# Patient Record
Sex: FEMALE
[De-identification: names, ages, dates, MRNs, and addresses within clinical notes are randomized per-mention and may not be internally consistent; named-entity substitution may affect disease eponyms.]

---

## 2018-04-27 PROBLEM — Z00.00 ENCOUNTER FOR PREVENTIVE HEALTH EXAMINATION: Status: ACTIVE | Noted: 2018-04-27

## 2018-05-09 ENCOUNTER — APPOINTMENT (OUTPATIENT)
Dept: CARDIOLOGY | Facility: CLINIC | Age: 83
End: 2018-05-09
Payer: MEDICARE

## 2018-05-09 ENCOUNTER — NON-APPOINTMENT (OUTPATIENT)
Age: 83
End: 2018-05-09

## 2018-05-09 VITALS
DIASTOLIC BLOOD PRESSURE: 86 MMHG | OXYGEN SATURATION: 98 % | HEIGHT: 60 IN | HEART RATE: 55 BPM | SYSTOLIC BLOOD PRESSURE: 139 MMHG | BODY MASS INDEX: 33.57 KG/M2 | WEIGHT: 171 LBS

## 2018-05-09 DIAGNOSIS — I21.9 ACUTE MYOCARDIAL INFARCTION, UNSPECIFIED: ICD-10-CM

## 2018-05-09 DIAGNOSIS — Z78.9 OTHER SPECIFIED HEALTH STATUS: ICD-10-CM

## 2018-05-09 PROCEDURE — 99215 OFFICE O/P EST HI 40 MIN: CPT | Mod: 25

## 2018-05-09 PROCEDURE — 93000 ELECTROCARDIOGRAM COMPLETE: CPT

## 2018-05-30 ENCOUNTER — OUTPATIENT (OUTPATIENT)
Dept: OUTPATIENT SERVICES | Facility: HOSPITAL | Age: 83
LOS: 1 days | End: 2018-05-30
Payer: COMMERCIAL

## 2018-05-30 DIAGNOSIS — I25.118 ATHEROSCLEROTIC HEART DISEASE OF NATIVE CORONARY ARTERY WITH OTHER FORMS OF ANGINA PECTORIS: ICD-10-CM

## 2018-05-30 PROCEDURE — 93017 CV STRESS TEST TRACING ONLY: CPT

## 2018-05-30 PROCEDURE — 93016 CV STRESS TEST SUPVJ ONLY: CPT

## 2018-05-30 PROCEDURE — A9500: CPT

## 2018-05-30 PROCEDURE — 78452 HT MUSCLE IMAGE SPECT MULT: CPT

## 2018-05-30 PROCEDURE — 93018 CV STRESS TEST I&R ONLY: CPT

## 2018-05-30 PROCEDURE — 78452 HT MUSCLE IMAGE SPECT MULT: CPT | Mod: 26

## 2018-06-07 ENCOUNTER — OTHER (OUTPATIENT)
Age: 83
End: 2018-06-07

## 2018-06-12 ENCOUNTER — FORM ENCOUNTER (OUTPATIENT)
Age: 83
End: 2018-06-12

## 2018-06-13 ENCOUNTER — OUTPATIENT (OUTPATIENT)
Dept: OUTPATIENT SERVICES | Facility: HOSPITAL | Age: 83
LOS: 1 days | End: 2018-06-13
Payer: COMMERCIAL

## 2018-06-13 DIAGNOSIS — I25.118 ATHEROSCLEROTIC HEART DISEASE OF NATIVE CORONARY ARTERY WITH OTHER FORMS OF ANGINA PECTORIS: ICD-10-CM

## 2018-06-13 PROCEDURE — 93306 TTE W/DOPPLER COMPLETE: CPT

## 2018-06-13 PROCEDURE — 93306 TTE W/DOPPLER COMPLETE: CPT | Mod: 26

## 2018-08-15 ENCOUNTER — APPOINTMENT (OUTPATIENT)
Dept: CARDIOLOGY | Facility: CLINIC | Age: 83
End: 2018-08-15

## 2018-12-19 ENCOUNTER — APPOINTMENT (OUTPATIENT)
Dept: CARDIOLOGY | Facility: CLINIC | Age: 83
End: 2018-12-19
Payer: MEDICARE

## 2018-12-19 ENCOUNTER — NON-APPOINTMENT (OUTPATIENT)
Age: 83
End: 2018-12-19

## 2018-12-19 VITALS
WEIGHT: 168 LBS | RESPIRATION RATE: 14 BRPM | OXYGEN SATURATION: 95 % | DIASTOLIC BLOOD PRESSURE: 60 MMHG | HEIGHT: 60 IN | SYSTOLIC BLOOD PRESSURE: 110 MMHG | BODY MASS INDEX: 32.98 KG/M2 | HEART RATE: 54 BPM

## 2018-12-19 PROCEDURE — 99215 OFFICE O/P EST HI 40 MIN: CPT | Mod: 25

## 2018-12-19 PROCEDURE — 93000 ELECTROCARDIOGRAM COMPLETE: CPT

## 2018-12-19 RX ORDER — GLIPIZIDE 2.5 MG/1
2.5 TABLET, EXTENDED RELEASE ORAL DAILY
Refills: 0 | Status: ACTIVE | COMMUNITY

## 2018-12-19 NOTE — DISCUSSION/SUMMARY
[FreeTextEntry1] : 86 yo woman with known CAD and moderate AS\par C/O severe heartburn that resolves with omeprazole. Refuses to go for GED\par Nuclear stress revealed non reversible anterior ischemia and EF 36%.\par Limited ambulation because of the knee pain\par Will continue all medications\par Follow up in 6 months

## 2018-12-19 NOTE — HISTORY OF PRESENT ILLNESS
[FreeTextEntry1] : 85 y woman with known HTN, DM2 and hyperlipidemia all under medical management. Has known CAD since 12/2015 when she was admitted at [facility] with a myocardial infarction and underwent urgent coronary angiography and PCI to the LAD. Preserved LV function. No records available at the present time. Since then under medical follow up.\par C/O effort related SOB, but seems to be limited in her ambulation because of left knee pain (arthritis). Also c/o severe heartburn that resolves immediately after taking omeprazole. She adamantly refuses to have an endoscopy. \par Had LE cataract surgery with no complications. \par Denies chest pain, orthopnea or PND. Occasional dizziness. No palpitations or ankle swelling.\par Denies smoking, has social drinking. Denies the use of illicit drugs.\par \par

## 2018-12-19 NOTE — REASON FOR VISIT
[Follow-Up - Clinic] : a clinic follow-up of [Spouse] : spouse [FreeTextEntry1] : Follow up for HTN and reflux

## 2018-12-19 NOTE — PHYSICAL EXAM
[General Appearance - Well Developed] : well developed [Normal Appearance] : normal appearance [General Appearance - Well Nourished] : well nourished [No Deformities] : no deformities [Normal Conjunctiva] : the conjunctiva exhibited no abnormalities [Normal Oral Mucosa] : normal oral mucosa [No Oral Pallor] : no oral pallor [Normal Oropharynx] : normal oropharynx [Normal Jugular Venous V Waves Present] : normal jugular venous V waves present [Respiration, Rhythm And Depth] : normal respiratory rhythm and effort [Exaggerated Use Of Accessory Muscles For Inspiration] : no accessory muscle use [Auscultation Breath Sounds / Voice Sounds] : lungs were clear to auscultation bilaterally [Chest Palpation] : palpation of the chest revealed no abnormalities [Lungs Percussion] : the lungs were normal to percussion [Heart Rate And Rhythm] : heart rate and rhythm were normal [Heart Sounds] : normal S1 and S2 [Arterial Pulses Normal] : the arterial pulses were normal [Edema] : no peripheral edema present [Veins - Varicosity Changes] : no varicosital changes were noted in the lower extremities [Systolic grade ___/6] : A grade [unfilled]/6 systolic murmur was heard. [Bowel Sounds] : normal bowel sounds [Abdomen Soft] : soft [Abdomen Tenderness] : non-tender [Abdomen Mass (___ Cm)] : no abdominal mass palpated [Abdomen Hernia] : no hernia was discovered [Abnormal Walk] : normal gait [Nail Clubbing] : no clubbing of the fingernails [Cyanosis, Localized] : no localized cyanosis [Petechial Hemorrhages (___cm)] : no petechial hemorrhages [Nail Splinter Hemorrhages] : no splinter hemorrhages of the nails [Skin Color & Pigmentation] : normal skin color and pigmentation [Skin Turgor] : normal skin turgor [] : no rash [Oriented To Time, Place, And Person] : oriented to person, place, and time [Impaired Insight] : insight and judgment were intact [No Anxiety] : not feeling anxious [FreeTextEntry1] : OPAL 3/6 at the LSB with preserved S2

## 2018-12-19 NOTE — REVIEW OF SYSTEMS
[Shortness Of Breath] : shortness of breath [Dyspnea on exertion] : dyspnea during exertion [Negative] : Heme/Lymph [Heartburn] : heartburn

## 2018-12-19 NOTE — ASSESSMENT
[FreeTextEntry1] : ECG performed today at the clinic revealed sinus rhythm 50 BPM, LPHB and RBBB with Q in V1-3

## 2019-06-05 ENCOUNTER — NON-APPOINTMENT (OUTPATIENT)
Age: 84
End: 2019-06-05

## 2019-06-05 ENCOUNTER — APPOINTMENT (OUTPATIENT)
Dept: CARDIOLOGY | Facility: CLINIC | Age: 84
End: 2019-06-05
Payer: MEDICARE

## 2019-06-05 VITALS
DIASTOLIC BLOOD PRESSURE: 74 MMHG | WEIGHT: 168 LBS | BODY MASS INDEX: 32.81 KG/M2 | SYSTOLIC BLOOD PRESSURE: 118 MMHG | OXYGEN SATURATION: 96 % | HEART RATE: 61 BPM

## 2019-06-05 DIAGNOSIS — Z87.448 PERSONAL HISTORY OF OTHER DISEASES OF URINARY SYSTEM: ICD-10-CM

## 2019-06-05 DIAGNOSIS — K21.9 GASTRO-ESOPHAGEAL REFLUX DISEASE W/OUT ESOPHAGITIS: ICD-10-CM

## 2019-06-05 PROCEDURE — 93000 ELECTROCARDIOGRAM COMPLETE: CPT

## 2019-06-05 PROCEDURE — 93306 TTE W/DOPPLER COMPLETE: CPT

## 2019-06-05 PROCEDURE — 99214 OFFICE O/P EST MOD 30 MIN: CPT | Mod: 25

## 2019-06-05 NOTE — ASSESSMENT
[FreeTextEntry1] : ECG performed today at the clinic revealed sinus rhythm 51 BPM, LPHB and RBBB with Q in V1-3

## 2019-06-05 NOTE — REVIEW OF SYSTEMS
[Shortness Of Breath] : shortness of breath [Dyspnea on exertion] : dyspnea during exertion [Heartburn] : heartburn [Negative] : Psychiatric

## 2019-06-05 NOTE — PHYSICAL EXAM
[Normal Appearance] : normal appearance [General Appearance - Well Developed] : well developed [General Appearance - Well Nourished] : well nourished [Normal Conjunctiva] : the conjunctiva exhibited no abnormalities [No Deformities] : no deformities [Normal Oral Mucosa] : normal oral mucosa [No Oral Pallor] : no oral pallor [Normal Oropharynx] : normal oropharynx [Normal Jugular Venous V Waves Present] : normal jugular venous V waves present [Exaggerated Use Of Accessory Muscles For Inspiration] : no accessory muscle use [Respiration, Rhythm And Depth] : normal respiratory rhythm and effort [Auscultation Breath Sounds / Voice Sounds] : lungs were clear to auscultation bilaterally [Lungs Percussion] : the lungs were normal to percussion [Chest Palpation] : palpation of the chest revealed no abnormalities [Heart Rate And Rhythm] : heart rate and rhythm were normal [Heart Sounds] : normal S1 and S2 [Veins - Varicosity Changes] : no varicosital changes were noted in the lower extremities [Arterial Pulses Normal] : the arterial pulses were normal [Edema] : no peripheral edema present [Systolic grade ___/6] : A grade [unfilled]/6 systolic murmur was heard. [Abdomen Soft] : soft [Bowel Sounds] : normal bowel sounds [Abdomen Tenderness] : non-tender [Abdomen Hernia] : no hernia was discovered [Abdomen Mass (___ Cm)] : no abdominal mass palpated [Abnormal Walk] : normal gait [Nail Clubbing] : no clubbing of the fingernails [Cyanosis, Localized] : no localized cyanosis [Petechial Hemorrhages (___cm)] : no petechial hemorrhages [Nail Splinter Hemorrhages] : no splinter hemorrhages of the nails [Skin Turgor] : normal skin turgor [] : no rash [Skin Color & Pigmentation] : normal skin color and pigmentation [Oriented To Time, Place, And Person] : oriented to person, place, and time [Impaired Insight] : insight and judgment were intact [No Anxiety] : not feeling anxious [FreeTextEntry1] : OPAL 4/6 at the LSB with diminished S2

## 2019-06-05 NOTE — DISCUSSION/SUMMARY
[FreeTextEntry1] : 86 yo woman with known CAD and moderate AS, might be getting to severe according to auscultation and has effort related SOB (NYHA 3/4)\par C/O severe heartburn that resolves with omeprazole. Refuses to go for GED\par Limited ambulation because of the knee pain and SOB\par Will continue all medications\par Will perform echocardiogram to assess AS and LVEF\par Follow up in 6 months

## 2019-06-05 NOTE — HISTORY OF PRESENT ILLNESS
[FreeTextEntry1] : 85 y woman with known HTN, DM2 and hyperlipidemia all under medical management. Has known CAD since 12/2015 when she was admitted at [facility] with a myocardial infarction and underwent urgent coronary angiography and PCI to the LAD. Preserved LV function. No records available at the present time. Since then under medical follow up.\par C/O effort related SOB (NYHA=3/4) but seems to be limited in her ambulation because of left knee pain (arthritis). Had  arthrocentesis but still has severe pain while walking. Also c/o severe heartburn that resolves immediately after taking omeprazole. She adamantly refuses to have an endoscopy. She takes NSAIDs for the knee pain and she understands that it will aggravate her GERD and is not recommended in pts with CAD. \par Last echocardiogram was done in 6/2018 revealed moderate LV dysfunction with EF= 40-45% and moderate AS 1.16 cm2. \par Had BE cataract surgery with no complications. \par Denies chest pain, orthopnea or PND. Occasional dizziness. No palpitations or ankle swelling.\par Denies smoking, has social drinking. Denies the use of illicit drugs.\par \par

## 2019-07-10 ENCOUNTER — OUTPATIENT (OUTPATIENT)
Dept: OUTPATIENT SERVICES | Facility: HOSPITAL | Age: 84
LOS: 1 days | End: 2019-07-10
Payer: COMMERCIAL

## 2019-07-10 VITALS — WEIGHT: 160.06 LBS | HEIGHT: 60 IN

## 2019-07-10 VITALS
HEIGHT: 60 IN | OXYGEN SATURATION: 100 % | RESPIRATION RATE: 20 BRPM | SYSTOLIC BLOOD PRESSURE: 137 MMHG | HEART RATE: 58 BPM | WEIGHT: 167.55 LBS | TEMPERATURE: 98 F | DIASTOLIC BLOOD PRESSURE: 65 MMHG

## 2019-07-10 DIAGNOSIS — Z01.810 ENCOUNTER FOR PREPROCEDURAL CARDIOVASCULAR EXAMINATION: ICD-10-CM

## 2019-07-10 DIAGNOSIS — Z98.61 CORONARY ANGIOPLASTY STATUS: Chronic | ICD-10-CM

## 2019-07-10 DIAGNOSIS — R06.02 SHORTNESS OF BREATH: ICD-10-CM

## 2019-07-10 LAB
ANION GAP SERPL CALC-SCNC: 11 MMOL/L — SIGNIFICANT CHANGE UP (ref 5–17)
APTT BLD: 30.3 SEC — SIGNIFICANT CHANGE UP (ref 27.5–36.3)
BUN SERPL-MCNC: 19 MG/DL — SIGNIFICANT CHANGE UP (ref 8–20)
CALCIUM SERPL-MCNC: 10.1 MG/DL — SIGNIFICANT CHANGE UP (ref 8.6–10.2)
CHLORIDE SERPL-SCNC: 107 MMOL/L — SIGNIFICANT CHANGE UP (ref 98–107)
CO2 SERPL-SCNC: 23 MMOL/L — SIGNIFICANT CHANGE UP (ref 22–29)
CREAT SERPL-MCNC: 0.95 MG/DL — SIGNIFICANT CHANGE UP (ref 0.5–1.3)
GLUCOSE SERPL-MCNC: 108 MG/DL — SIGNIFICANT CHANGE UP (ref 70–115)
HCT VFR BLD CALC: 36.9 % — SIGNIFICANT CHANGE UP (ref 34.5–45)
HGB BLD-MCNC: 12.2 G/DL — SIGNIFICANT CHANGE UP (ref 11.5–15.5)
INR BLD: 1.04 RATIO — SIGNIFICANT CHANGE UP (ref 0.88–1.16)
MAGNESIUM SERPL-MCNC: 1.8 MG/DL — SIGNIFICANT CHANGE UP (ref 1.6–2.6)
MCHC RBC-ENTMCNC: 31.7 PG — SIGNIFICANT CHANGE UP (ref 27–34)
MCHC RBC-ENTMCNC: 33.1 GM/DL — SIGNIFICANT CHANGE UP (ref 32–36)
MCV RBC AUTO: 95.8 FL — SIGNIFICANT CHANGE UP (ref 80–100)
PLATELET # BLD AUTO: 201 K/UL — SIGNIFICANT CHANGE UP (ref 150–400)
POTASSIUM SERPL-MCNC: 4.8 MMOL/L — SIGNIFICANT CHANGE UP (ref 3.5–5.3)
POTASSIUM SERPL-SCNC: 4.8 MMOL/L — SIGNIFICANT CHANGE UP (ref 3.5–5.3)
PROTHROM AB SERPL-ACNC: 12 SEC — SIGNIFICANT CHANGE UP (ref 10–12.9)
RBC # BLD: 3.85 M/UL — SIGNIFICANT CHANGE UP (ref 3.8–5.2)
RBC # FLD: 12.7 % — SIGNIFICANT CHANGE UP (ref 10.3–14.5)
SODIUM SERPL-SCNC: 141 MMOL/L — SIGNIFICANT CHANGE UP (ref 135–145)
WBC # BLD: 8.47 K/UL — SIGNIFICANT CHANGE UP (ref 3.8–10.5)
WBC # FLD AUTO: 8.47 K/UL — SIGNIFICANT CHANGE UP (ref 3.8–10.5)

## 2019-07-10 PROCEDURE — 36415 COLL VENOUS BLD VENIPUNCTURE: CPT

## 2019-07-10 PROCEDURE — 93010 ELECTROCARDIOGRAM REPORT: CPT

## 2019-07-10 PROCEDURE — 85730 THROMBOPLASTIN TIME PARTIAL: CPT

## 2019-07-10 PROCEDURE — 93005 ELECTROCARDIOGRAM TRACING: CPT

## 2019-07-10 PROCEDURE — 83735 ASSAY OF MAGNESIUM: CPT

## 2019-07-10 PROCEDURE — G0463: CPT

## 2019-07-10 PROCEDURE — 85610 PROTHROMBIN TIME: CPT

## 2019-07-10 PROCEDURE — 80048 BASIC METABOLIC PNL TOTAL CA: CPT

## 2019-07-10 PROCEDURE — 85027 COMPLETE CBC AUTOMATED: CPT

## 2019-07-10 RX ORDER — SODIUM CHLORIDE 9 MG/ML
1000 INJECTION INTRAMUSCULAR; INTRAVENOUS; SUBCUTANEOUS
Refills: 0 | Status: DISCONTINUED | OUTPATIENT
Start: 2019-07-12 | End: 2019-07-27

## 2019-07-10 NOTE — H&P PST ADULT - NSICDXPROBLEM_GEN_ALL_CORE_FT
PROBLEM DIAGNOSES  Problem: Shortness of breath  Assessment and Plan: -R&LHC to assess CAD, pressures, aortic valve  area  -procedure d/w patient; risks and benefits discussed, questions answered  -IVF pre procedure as ordered  -labs, ECG, diagnostic reports, cards outpt notes all reviewed  -npo after midnight

## 2019-07-10 NOTE — H&P PST ADULT - NSICDXPASTMEDICALHX_GEN_ALL_CORE_FT
PAST MEDICAL HISTORY:  Bilateral knee pain     CAD (coronary artery disease)     Diabetes mellitus     Excessive use of nonsteroidal anti-inflammatory drug (NSAID)     GERD (gastroesophageal reflux disease)     Grade II diastolic dysfunction     HLD (hyperlipidemia)     HTN (hypertension)     Moderate aortic stenosis     STEMI involving left anterior descending coronary artery PCI 2015

## 2019-07-10 NOTE — H&P PST ADULT - NEUROLOGICAL DETAILS
responds to pain/alert and oriented x 3/no spontaneous movement/responds to verbal commands/normal strength/sensation intact/deep reflexes intact

## 2019-07-10 NOTE — H&P PST ADULT - RS GEN PE MLT RESP DETAILS PC
good air movement/clear to auscultation bilaterally/respirations non-labored/breath sounds equal/normal/airway patent

## 2019-07-10 NOTE — H&P PST ADULT - HISTORY OF PRESENT ILLNESS
Narrative: This is an 84 y/o white F with a PMHx of HTN, DM II, HLD, moderate aortic stenosis, grade II diastolic dysfunction, STEMI with PCI to LAD (2015; at [facility] per pt; report requested and pending; no available stent card), NYHA III (EF 40-45%) presents today for R&LHC with Dr Humphreys to assess CAD, right heart pressures and aortic valve area. Known risk factors include previous MI, known CAD, NYHA III; pt reports increasing symptoms of shortness of breath, HAQUE. She reports she has bilateral knee pain daily and frequently uses NSAIDs which is not recommended in patients with CAD. Pt currently denies chest pain, SOB, dyspnea, palps, lower extremity edema.    Assessment of LVEF:       EF: 35-40%       Assessed by: TTE       Date: 6/5/2019; report revealed apical septal segment, apical anterior segment, apical inferior segment and apex abnormal, moderate decreased global LV systolic function, EF 35-40%, mildly increased LV internal cavity size, mitral valve leaflets are tethered which is due to reduced systolic function and elevated LVEDP, LAP 36mmHg, normal RV size and function, severely enlarged LA, mild MR, mild AR, no effusion.   Aortic valve: LVOT Vmax 0.75m/s, Aov Area, Vmax 1.04cm, LVOT VTI 0.179m, AoV area 1.10cm, AoV cusp separation 1.60cm    Prior Cardiac Interventions:       PCI's: STEMI with PCI to LAD (2015; at [facility] per pt; report requested and pending; no available stent card)    Pharmacologic Stress Test from 5/30/2018 revealed medium sized, severe defects in mid to distal anterior, inferior and anteroseptal as well as apical cap that are fixed, suggestive of infarct, akinetic mid to distal anterior, anteroseptal, inferior and apical cap with overall LVEF 36%, LVEDV 135ml      Antianginal Therapies:        Beta Blockers:  metoprolol 50mg daily      ASA: 3  Mallampati: 2  Bleeding Risk:  Creatinine:0.95  GFR:55 Narrative: This is an 86 y/o white F with a PMHx of HTN, DM II, HLD, moderate aortic stenosis, grade II diastolic dysfunction, STEMI with PCI to LAD (2015; at [facility] per pt; report requested and pending; no available stent card), NYHA III (EF 40-45%) presents today for R&LHC with Dr Humphreys to assess CAD, right heart pressures and aortic valve area. Known risk factors include previous MI, known CAD, NYHA III; pt reports increasing symptoms of shortness of breath, HAQUE. She reports she has bilateral knee pain daily and frequently uses NSAIDs which is not recommended in patients with CAD. Pt currently denies chest pain, SOB, dyspnea, palps, lower extremity edema.    Assessment of LVEF:       EF: 35-40%       Assessed by: TTE       Date: 6/5/2019; report revealed apical septal segment, apical anterior segment, apical inferior segment and apex abnormal, moderate decreased global LV systolic function, EF 35-40%, mildly increased LV internal cavity size, mitral valve leaflets are tethered which is due to reduced systolic function and elevated LVEDP, LAP 36mmHg, normal RV size and function, severely enlarged LA, mild MR, mild AR, no effusion.   Aortic valve: LVOT Vmax 0.75m/s, Aov Area, Vmax 1.04cm, LVOT VTI 0.179m, AoV area 1.10cm, AoV cusp separation 1.60cm    Prior Cardiac Interventions:       PCI's: STEMI with PCI to LAD (2015; at [facility] per pt; report requested and pending; no available stent card)    Pharmacologic Stress Test from 5/30/2018 revealed medium sized, severe defects in mid to distal anterior, inferior and anteroseptal as well as apical cap that are fixed, suggestive of infarct, akinetic mid to distal anterior, anteroseptal, inferior and apical cap with overall LVEF 36%, LVEDV 135ml      Antianginal Therapies:        Beta Blockers:  metoprolol 50mg daily      ASA: 3  Mallampati: 2  Bleeding Risk: 7.6%  Creatinine: 0.95  GFR: 55

## 2019-07-10 NOTE — H&P PST ADULT - ASSESSMENT
84 y/o white F with a PMHx of HTN, DM II, HLD, moderate aortic stenosis, grade II diastolic dysfunction, STEMI with PCI to LAD (2015; at [facility] per pt; report requested and pending; no available stent card), NYHA III (EF 40-45%) presents today for R&LHC with Dr Humphreys to assess CAD, right heart pressures and aortic valve area.

## 2019-07-10 NOTE — H&P PST ADULT - NEGATIVE NEUROLOGICAL SYMPTOMS
no generalized seizures/no loss of sensation/no transient paralysis/no weakness/no syncope/no headache/no paresthesias/no focal seizures/no tremors/no vertigo

## 2019-07-12 ENCOUNTER — TRANSCRIPTION ENCOUNTER (OUTPATIENT)
Age: 84
End: 2019-07-12

## 2019-07-12 ENCOUNTER — OUTPATIENT (OUTPATIENT)
Dept: OUTPATIENT SERVICES | Facility: HOSPITAL | Age: 84
LOS: 1 days | End: 2019-07-12
Payer: COMMERCIAL

## 2019-07-12 VITALS
HEART RATE: 56 BPM | RESPIRATION RATE: 18 BRPM | DIASTOLIC BLOOD PRESSURE: 69 MMHG | OXYGEN SATURATION: 96 % | SYSTOLIC BLOOD PRESSURE: 133 MMHG | TEMPERATURE: 98 F

## 2019-07-12 VITALS
OXYGEN SATURATION: 97 % | SYSTOLIC BLOOD PRESSURE: 119 MMHG | DIASTOLIC BLOOD PRESSURE: 62 MMHG | HEART RATE: 63 BPM | RESPIRATION RATE: 18 BRPM

## 2019-07-12 DIAGNOSIS — Z98.61 CORONARY ANGIOPLASTY STATUS: Chronic | ICD-10-CM

## 2019-07-12 DIAGNOSIS — I35.0 NONRHEUMATIC AORTIC (VALVE) STENOSIS: ICD-10-CM

## 2019-07-12 PROCEDURE — 99152 MOD SED SAME PHYS/QHP 5/>YRS: CPT

## 2019-07-12 PROCEDURE — C1894: CPT

## 2019-07-12 PROCEDURE — C1887: CPT

## 2019-07-12 PROCEDURE — C1769: CPT

## 2019-07-12 PROCEDURE — C1889: CPT

## 2019-07-12 PROCEDURE — 93460 R&L HRT ART/VENTRICLE ANGIO: CPT | Mod: 26

## 2019-07-12 PROCEDURE — 99153 MOD SED SAME PHYS/QHP EA: CPT

## 2019-07-12 PROCEDURE — 93460 R&L HRT ART/VENTRICLE ANGIO: CPT

## 2019-07-12 RX ORDER — LOSARTAN POTASSIUM 100 MG/1
1 TABLET, FILM COATED ORAL
Qty: 0 | Refills: 0 | DISCHARGE

## 2019-07-12 NOTE — PROGRESS NOTE ADULT - ASSESSMENT
Narrative: This is an 86 y/o white F with a PMHx of HTN, DM II, HLD, moderate aortic stenosis, grade II diastolic dysfunction, STEMI with PCI to LAD (2015; at [facility] per pt; report requested and pending; no available stent card), NYHA III (EF 40-45%) presents today for R&LHC with Dr Humphreys to assess CAD, right heart pressures and aortic valve area. Known risk factors include previous MI, known CAD, NYHA III; pt reports increasing symptoms of shortness of breath, HAQUE. She reports she has bilateral knee pain daily and frequently uses NSAIDs which is not recommended in patients with CAD. Pt currently denies chest pain, SOB, dyspnea, palps, lower extremity edema.    Assessment of LVEF:       EF: 35-40%       Assessed by: TTE       Date: 6/5/2019; report revealed apical septal segment, apical anterior segment, apical inferior segment and apex abnormal, moderate decreased global LV systolic function, EF 35-40%, mildly increased LV internal cavity size, mitral valve leaflets are tethered which is due to reduced systolic function and elevated LVEDP, LAP 36mmHg, normal RV size and function, severely enlarged LA, mild MR, mild AR, no effusion.   Aortic valve: LVOT Vmax 0.75m/s, Aov Area, Vmax 1.04cm, LVOT VTI 0.179m, AoV area 1.10cm, AoV cusp separation 1.60cm    Prior Cardiac Interventions:       PCI's: STEMI with PCI to LAD (2015; at [facility] per pt; report requested and pending; no available stent card)    Pharmacologic Stress Test from 5/30/2018 revealed medium sized, severe defects in mid to distal anterior, inferior and anteroseptal as well as apical cap that are fixed, suggestive of infarct, akinetic mid to distal anterior, anteroseptal, inferior and apical cap with overall LVEF 36%, LVEDV 135ml      Antianginal Therapies:        Beta Blockers:  metoprolol 50mg daily      ASA: 3  Mallampati: 2  Bleeding Risk: 7.6%  Creatinine: 0.95  GFR: 55

## 2019-07-12 NOTE — DISCHARGE NOTE PROVIDER - NSDCCPTREATMENT_GEN_ALL_CORE_FT
PRINCIPAL PROCEDURE  Procedure: Left heart cardiac cath  Findings and Treatment: s/p no intervention see full report

## 2019-07-12 NOTE — DISCHARGE NOTE PROVIDER - NSDCFUADDINST_GEN_ALL_CORE_FT
No heavy lifting, driving, sex, tub baths, swimming, or any activity that submerges the lower half of the body in water for 48 hours.  Limited walking and stairs for 48 hours.      Observe the site frequently.  If bleeding or a large lump (the size of a golf ball or bigger) occurs lie flat, apply continuous direct pressure just above the puncture site for at least 10 minutes, and notify your physician immediately.  If the bleeding cannot be controlled, call 911 immediately for assistance.  Notify your physician of pain, swelling or any drainage.    Notify your physician immediately if coldness, numbness, discoloration or pain in your foot occurs.

## 2019-07-12 NOTE — DISCHARGE NOTE PROVIDER - HOSPITAL COURSE
84 y/o white F with a PMHx of HTN, DM II, HLD, moderate aortic stenosis, grade II diastolic dysfunction, STEMI with PCI to LAD (2015; at [facility] per pt; report requested and pending; no available stent card), NYHA III (EF 40-45%) presents today for R&LHC with Dr Humphreys to assess CAD, right heart pressures and aortic valve area. Known risk factors include previous MI, known CAD, NYHA III; pt reports increasing symptoms of shortness of breath, HAQUE. She reports she has bilateral knee pain daily and frequently uses NSAIDs which is not recommended in patients with CAD. Now S/P C no intervention see full report, RFA with sheath removed by RN without incident. Pt currently denies chest pain, SOB, dyspnea, palps, lower extremity edema.         Neuro: A&OX3    Lungs: CTA B/L    CV: S1, S2, no murmur, RRR    Abd: Soft    Right Groin: Soft, no bleeding, no hematoma    Extremity: + distal pulses            A/P: 85y Female s/p C no intervention    1. Groin management discussed with patient    2. Continue current meds    3. Follow up as an outpatient with cardiologist    4. Bedrest x 3 hours    5. Discharge at 1400 if groin stable

## 2019-07-12 NOTE — DISCHARGE NOTE PROVIDER - CARE PROVIDER_API CALL
Maged Humphreys)  Cardiovascular Disease; Internal Medicine; Interventional Cardiology  Phone: [phone]  Fax: [phone]  Follow Up Time: 2 weeks

## 2019-07-12 NOTE — PROGRESS NOTE ADULT - SUBJECTIVE AND OBJECTIVE BOX
CC:  Patient is a 85y old  Female who presents with a chief complaint of     HPI:  Narrative: This is an 84 y/o white F with a PMHx of HTN, DM II, HLD, moderate aortic stenosis, grade II diastolic dysfunction, STEMI with PCI to LAD (2015; at [facility] per pt; report requested and pending; no available stent card), NYHA III (EF 40-45%) presents today for R&LHC with Dr Humphreys to assess CAD, right heart pressures and aortic valve area. Known risk factors include previous MI, known CAD, NYHA III; pt reports increasing symptoms of shortness of breath, HAQUE. She reports she has bilateral knee pain daily and frequently uses NSAIDs which is not recommended in patients with CAD. Pt currently denies chest pain, SOB, dyspnea, palps, lower extremity edema.    Assessment of LVEF:       EF: 35-40%       Assessed by: TTE       Date: 6/5/2019; report revealed apical septal segment, apical anterior segment, apical inferior segment and apex abnormal, moderate decreased global LV systolic function, EF 35-40%, mildly increased LV internal cavity size, mitral valve leaflets are tethered which is due to reduced systolic function and elevated LVEDP, LAP 36mmHg, normal RV size and function, severely enlarged LA, mild MR, mild AR, no effusion.   Aortic valve: LVOT Vmax 0.75m/s, Aov Area, Vmax 1.04cm, LVOT VTI 0.179m, AoV area 1.10cm, AoV cusp separation 1.60cm    Prior Cardiac Interventions:       PCI's: STEMI with PCI to LAD (2015; at [facility] per pt; report requested and pending; no available stent card)    Pharmacologic Stress Test from 5/30/2018 revealed medium sized, severe defects in mid to distal anterior, inferior and anteroseptal as well as apical cap that are fixed, suggestive of infarct, akinetic mid to distal anterior, anteroseptal, inferior and apical cap with overall LVEF 36%, LVEDV 135ml      Antianginal Therapies:        Beta Blockers:  metoprolol 50mg daily      ASA: 3  Mallampati: 2  Bleeding Risk: 7.6%  Creatinine: 0.95  GFR: 55      ROS: All review of systems negative unless indicated otherwise below.     Lab Results:  CBC  CBC Full  -  ( 10 Jul 2019 14:40 )  WBC Count : 8.47 K/uL  RBC Count : 3.85 M/uL  Hemoglobin : 12.2 g/dL  Hematocrit : 36.9 %  Platelet Count - Automated : 201 K/uL  Mean Cell Volume : 95.8 fl  Mean Cell Hemoglobin : 31.7 pg  Mean Cell Hemoglobin Concentration : 33.1 gm/dL  Auto Neutrophil # : x  Auto Lymphocyte # : x  Auto Monocyte # : x  Auto Eosinophil # : x  Auto Basophil # : x  Auto Neutrophil % : x  Auto Lymphocyte % : x  Auto Monocyte % : x  Auto Eosinophil % : x  Auto Basophil % : x    .		Differential:	[] Automated		[] Manual  Chemistry                        12.2   8.47  )-----------( 201      ( 10 Jul 2019 14:40 )             36.9     07-10    141  |  107  |  19.0  ----------------------------<  108  4.8   |  23.0  |  0.95    Ca    10.1      10 Jul 2019 14:40  Mg     1.8     07-10        PT/INR - ( 10 Jul 2019 14:40 )   PT: 12.0 sec;   INR: 1.04 ratio         PTT - ( 10 Jul 2019 14:40 )  PTT:30.3 sec      PHYSICAL EXAM:  Vital Signs Last 24 Hrs  T(C): 36.8 (12 Jul 2019 07:24), Max: 36.8 (12 Jul 2019 07:24)  T(F): 98.2 (12 Jul 2019 07:24), Max: 98.2 (12 Jul 2019 07:24)  HR: 56 (12 Jul 2019 07:24) (56 - 56)  BP: 133/69 (12 Jul 2019 07:24) (133/69 - 133/69)  BP(mean): --  RR: 18 (12 Jul 2019 07:24) (18 - 18)  SpO2: 96% (12 Jul 2019 07:24) (96% - 96%)  GENERAL: NAD, well-groomed, well-developed  HEAD:  Atraumatic, Normocephalic  NECK: Supple, No JVD  NERVOUS SYSTEM:  Alert & Oriented X3, Good concentration; Motor Strength 5/5 B/L upper and lower extremities, sensation intact  CHEST/LUNG: Clear to auscultation bilaterally, No rales, rhonchi, wheezing, or rubs  HEART: Regular rate and rhythm; s1 and s2 auscultated, No murmurs, rubs, or gallops  ABDOMEN: Soft, Nontender, Nondistended; Bowel sounds present and normoactive.   EXTREMITIES:  2+ Peripheral Pulses, No clubbing, cyanosis, or edema  SKIN: No rashes or lesions

## 2019-07-12 NOTE — DISCHARGE NOTE PROVIDER - NSDCCPCAREPLAN_GEN_ALL_CORE_FT
PRINCIPAL DISCHARGE DIAGNOSIS  Diagnosis: SOB (shortness of breath)  Assessment and Plan of Treatment: [facility] no intevention see full report

## 2019-07-12 NOTE — DISCHARGE NOTE PROVIDER - NSDCACTIVITY_GEN_ALL_CORE
Walking - Outdoors allowed/No heavy lifting/straining/Do not drive or operate machinery/Showering allowed/Walking - Indoors allowed

## 2019-07-13 PROBLEM — I10 ESSENTIAL (PRIMARY) HYPERTENSION: Chronic | Status: ACTIVE | Noted: 2019-07-10

## 2019-07-13 PROBLEM — E11.9 TYPE 2 DIABETES MELLITUS WITHOUT COMPLICATIONS: Chronic | Status: ACTIVE | Noted: 2019-07-10

## 2019-07-13 PROBLEM — K21.9 GASTRO-ESOPHAGEAL REFLUX DISEASE WITHOUT ESOPHAGITIS: Chronic | Status: ACTIVE | Noted: 2019-07-10

## 2019-07-13 PROBLEM — E78.5 HYPERLIPIDEMIA, UNSPECIFIED: Chronic | Status: ACTIVE | Noted: 2019-07-10

## 2019-07-13 PROBLEM — M25.561 PAIN IN RIGHT KNEE: Chronic | Status: ACTIVE | Noted: 2019-07-10

## 2019-07-13 PROBLEM — F19.90 OTHER PSYCHOACTIVE SUBSTANCE USE, UNSPECIFIED, UNCOMPLICATED: Chronic | Status: ACTIVE | Noted: 2019-07-10

## 2019-07-13 PROBLEM — I21.02 ST ELEVATION (STEMI) MYOCARDIAL INFARCTION INVOLVING LEFT ANTERIOR DESCENDING CORONARY ARTERY: Chronic | Status: ACTIVE | Noted: 2019-07-10

## 2019-07-13 PROBLEM — I25.10 ATHEROSCLEROTIC HEART DISEASE OF NATIVE CORONARY ARTERY WITHOUT ANGINA PECTORIS: Chronic | Status: ACTIVE | Noted: 2019-07-10

## 2019-07-13 PROBLEM — I35.0 NONRHEUMATIC AORTIC (VALVE) STENOSIS: Chronic | Status: ACTIVE | Noted: 2019-07-10

## 2019-07-13 PROBLEM — I51.9 HEART DISEASE, UNSPECIFIED: Chronic | Status: ACTIVE | Noted: 2019-07-10

## 2019-07-31 ENCOUNTER — APPOINTMENT (OUTPATIENT)
Dept: CARDIOLOGY | Facility: CLINIC | Age: 84
End: 2019-07-31
Payer: MEDICARE

## 2019-07-31 ENCOUNTER — NON-APPOINTMENT (OUTPATIENT)
Age: 84
End: 2019-07-31

## 2019-07-31 VITALS
BODY MASS INDEX: 32.79 KG/M2 | DIASTOLIC BLOOD PRESSURE: 77 MMHG | HEART RATE: 59 BPM | OXYGEN SATURATION: 99 % | WEIGHT: 167 LBS | SYSTOLIC BLOOD PRESSURE: 134 MMHG | HEIGHT: 60 IN

## 2019-07-31 PROCEDURE — 93000 ELECTROCARDIOGRAM COMPLETE: CPT

## 2019-07-31 PROCEDURE — 99214 OFFICE O/P EST MOD 30 MIN: CPT | Mod: 25

## 2019-07-31 NOTE — PHYSICAL EXAM
[General Appearance - Well Developed] : well developed [Normal Appearance] : normal appearance [General Appearance - Well Nourished] : well nourished [No Deformities] : no deformities [Normal Conjunctiva] : the conjunctiva exhibited no abnormalities [No Oral Pallor] : no oral pallor [Normal Oral Mucosa] : normal oral mucosa [Normal Oropharynx] : normal oropharynx [Normal Jugular Venous V Waves Present] : normal jugular venous V waves present [Respiration, Rhythm And Depth] : normal respiratory rhythm and effort [Exaggerated Use Of Accessory Muscles For Inspiration] : no accessory muscle use [Auscultation Breath Sounds / Voice Sounds] : lungs were clear to auscultation bilaterally [Chest Palpation] : palpation of the chest revealed no abnormalities [Lungs Percussion] : the lungs were normal to percussion [Heart Rate And Rhythm] : heart rate and rhythm were normal [Heart Sounds] : normal S1 and S2 [Arterial Pulses Normal] : the arterial pulses were normal [Edema] : no peripheral edema present [Veins - Varicosity Changes] : no varicosital changes were noted in the lower extremities [Systolic grade ___/6] : A grade [unfilled]/6 systolic murmur was heard. [Bowel Sounds] : normal bowel sounds [Abdomen Soft] : soft [Abdomen Tenderness] : non-tender [Abdomen Hernia] : no hernia was discovered [Abdomen Mass (___ Cm)] : no abdominal mass palpated [Abnormal Walk] : normal gait [Nail Clubbing] : no clubbing of the fingernails [Petechial Hemorrhages (___cm)] : no petechial hemorrhages [Cyanosis, Localized] : no localized cyanosis [Nail Splinter Hemorrhages] : no splinter hemorrhages of the nails [Skin Color & Pigmentation] : normal skin color and pigmentation [Skin Turgor] : normal skin turgor [] : no rash [Impaired Insight] : insight and judgment were intact [Oriented To Time, Place, And Person] : oriented to person, place, and time [No Anxiety] : not feeling anxious [FreeTextEntry1] : OPAL 4/6 at the LSB with diminished S2

## 2019-07-31 NOTE — REVIEW OF SYSTEMS
[Dyspnea on exertion] : dyspnea during exertion [Shortness Of Breath] : shortness of breath [Heartburn] : heartburn [Negative] : Heme/Lymph

## 2019-07-31 NOTE — DISCUSSION/SUMMARY
[FreeTextEntry1] : 86 yo woman with known CAD and mild moderate AS. Recent cath revealed mild irreg of the coronary arteries, mild AS, reduced EF (30-35%) and mild Pul HTN. Still symptomatic with NYHA 3/4. Will start Entresto and will stop Losartan. \par Discussed ICD that she refuses at the present time\par Will repeat BMP 7-10 days after to assess kidney function\par Will continue all other meds\par C/O severe heartburn that resolves with omeprazole. Refuses to go for GED\par Limited ambulation because of the knee pain and SOB\par Will continue all medications\par Follow up in 3 months

## 2019-07-31 NOTE — ASSESSMENT
[FreeTextEntry1] : ECG performed today at the clinic revealed sinus rhythm 55 BPM, LPHB and RBBB with Q in V1-3

## 2019-07-31 NOTE — HISTORY OF PRESENT ILLNESS
[FreeTextEntry1] : 85 y woman with known HTN, DM2 and hyperlipidemia all under medical management. Has known CAD since 12/2015 when she was admitted at [facility] with a myocardial infarction and underwent urgent coronary angiography and PCI to the LAD. Preserved LV function. No records available at the present time. Since then under medical follow up.\par Last echocardiogram was done in 6/2018 revealed moderate LV dysfunction with EF= 40-45% and moderate AS 1.16 cm2. \par On 7/12/2019 she had a Rt and Lt heart cath that revealed mild irregularities in the coronary arteries, reduced EF (30%), and mild aortic stenosis with a gradient of 9 mmHg and DANIELLE=1.8 cm2. Right heart pressures were mildly elevated (PAP=27 mmHg). \par Had BE cataract surgery with no complications. \par C/O effort related SOB (NYHA=3/4) but seems to be limited in her ambulation because of left knee pain (arthritis). Had  arthrocentesis but still has severe pain while walking. Also c/o severe heartburn that resolves immediately after taking omeprazole. She adamantly refuses to have an endoscopy. She takes NSAIDs for the knee pain and she understands that it will aggravate her GERD and is not recommended in pts with CAD. Denies chest pain, orthopnea or PND. \par Denies smoking, has social drinking. Denies the use of illicit drugs.\par \par

## 2019-09-23 ENCOUNTER — MEDICATION RENEWAL (OUTPATIENT)
Age: 84
End: 2019-09-23

## 2019-10-28 RX ORDER — SACUBITRIL AND VALSARTAN 24; 26 MG/1; MG/1
24-26 TABLET, FILM COATED ORAL TWICE DAILY
Qty: 60 | Refills: 3 | Status: DISCONTINUED | COMMUNITY
Start: 2019-07-31 | End: 2019-10-28

## 2019-10-30 ENCOUNTER — NON-APPOINTMENT (OUTPATIENT)
Age: 84
End: 2019-10-30

## 2019-10-30 ENCOUNTER — APPOINTMENT (OUTPATIENT)
Dept: CARDIOLOGY | Facility: CLINIC | Age: 84
End: 2019-10-30
Payer: MEDICARE

## 2019-10-30 VITALS
DIASTOLIC BLOOD PRESSURE: 72 MMHG | WEIGHT: 175 LBS | HEART RATE: 53 BPM | SYSTOLIC BLOOD PRESSURE: 129 MMHG | BODY MASS INDEX: 34.18 KG/M2 | OXYGEN SATURATION: 98 %

## 2019-10-30 PROCEDURE — 99214 OFFICE O/P EST MOD 30 MIN: CPT | Mod: 25

## 2019-10-30 PROCEDURE — 93000 ELECTROCARDIOGRAM COMPLETE: CPT

## 2019-10-30 NOTE — ASSESSMENT
[FreeTextEntry1] : ECG performed today at the clinic revealed sinus rhythm 59 BPM, LPHB and RBBB with Q in V1-3

## 2019-10-30 NOTE — PHYSICAL EXAM
[General Appearance - Well Developed] : well developed [Normal Appearance] : normal appearance [General Appearance - Well Nourished] : well nourished [No Deformities] : no deformities [Normal Conjunctiva] : the conjunctiva exhibited no abnormalities [Normal Oral Mucosa] : normal oral mucosa [No Oral Pallor] : no oral pallor [Normal Oropharynx] : normal oropharynx [Normal Jugular Venous V Waves Present] : normal jugular venous V waves present [Respiration, Rhythm And Depth] : normal respiratory rhythm and effort [Exaggerated Use Of Accessory Muscles For Inspiration] : no accessory muscle use [Auscultation Breath Sounds / Voice Sounds] : lungs were clear to auscultation bilaterally [Chest Palpation] : palpation of the chest revealed no abnormalities [Lungs Percussion] : the lungs were normal to percussion [Heart Rate And Rhythm] : heart rate and rhythm were normal [Heart Sounds] : normal S1 and S2 [Arterial Pulses Normal] : the arterial pulses were normal [Edema] : no peripheral edema present [Veins - Varicosity Changes] : no varicosital changes were noted in the lower extremities [Systolic grade ___/6] : A grade [unfilled]/6 systolic murmur was heard. [Bowel Sounds] : normal bowel sounds [Abdomen Soft] : soft [Abdomen Tenderness] : non-tender [Abdomen Mass (___ Cm)] : no abdominal mass palpated [Abdomen Hernia] : no hernia was discovered [Abnormal Walk] : normal gait [Nail Clubbing] : no clubbing of the fingernails [Cyanosis, Localized] : no localized cyanosis [Petechial Hemorrhages (___cm)] : no petechial hemorrhages [Nail Splinter Hemorrhages] : no splinter hemorrhages of the nails [Skin Color & Pigmentation] : normal skin color and pigmentation [Skin Turgor] : normal skin turgor [] : no rash [Oriented To Time, Place, And Person] : oriented to person, place, and time [Impaired Insight] : insight and judgment were intact [No Anxiety] : not feeling anxious [FreeTextEntry1] : Lasegue negative at 45 degrees

## 2019-10-30 NOTE — DISCUSSION/SUMMARY
[FreeTextEntry1] : 87 yo woman with known CAD and mild AS. Recent cath revealed mild irreg of the coronary arteries, mild AS (1.8 cm2), reduced EF (30-35%) and mild Pul HTN. Still symptomatic with NYHA 3/4. Started on Entresto and seems to be feeling better. Today C/O mainly of low back pain with radiation to the legs, bilateral. States that she had an MRI done at [facility]. No results available. Requested to see those results. \par Routine blood work including BMP was WNL. \par Discussed ICD that she refuses at the present time.\par Will continue all other meds\par Follow up in 3 months

## 2019-10-30 NOTE — HISTORY OF PRESENT ILLNESS
[FreeTextEntry1] : 87 yo woman with known HTN, DM2 and hyperlipidemia all under medical management. Has known CAD since 12/2015 when she was admitted at [facility] with a myocardial infarction and underwent urgent coronary angiography and PCI to the LAD. Preserved LV function. No records available at the present time. Since then under medical follow up.\par Last echocardiogram was done in 6/2018 revealed moderate LV dysfunction with EF= 40-45% and moderate AS 1.16 cm2. \par On 7/12/2019 she had a Rt and Lt heart cath that revealed mild irregularities in the coronary arteries, patent stent in the LAD, reduced EF (30%), and mild aortic stenosis with a gradient of 9 mmHg and DANIELLE=1.8 cm2. Right heart pressures were mildly elevated (PAP=27 mmHg). \par Had BE cataract surgery with no complications. \par C/O effort related SOB (NYHA=3/4) but seems to be limited in her ambulation because of bilateral leg pain with severe pain while walking. Also c/o severe heartburn that resolves immediately after taking omeprazole. She adamantly refuses to have an endoscopy. She takes NSAIDs for the knee pain and she understands that it will aggravate her GERD and is not recommended in pts with CAD. Denies chest pain, orthopnea or PND. \par Denies smoking, has social drinking. Denies the use of illicit drugs.\par \par

## 2019-10-30 NOTE — HISTORY OF PRESENT ILLNESS
[FreeTextEntry1] : 85 yo woman with known HTN, DM2 and hyperlipidemia all under medical management. Has known CAD since 12/2015 when she was admitted at [facility] with a myocardial infarction and underwent urgent coronary angiography and PCI to the LAD. Preserved LV function. No records available at the present time. Since then under medical follow up.\par Last echocardiogram was done in 6/2018 revealed moderate LV dysfunction with EF= 40-45% and moderate AS 1.16 cm2. \par On 7/12/2019 she had a Rt and Lt heart cath that revealed mild irregularities in the coronary arteries, patent stent in the LAD, reduced EF (30%), and mild aortic stenosis with a gradient of 9 mmHg and DANIELLE=1.8 cm2. Right heart pressures were mildly elevated (PAP=27 mmHg). \par Had BE cataract surgery with no complications. \par C/O effort related SOB (NYHA=3/4) but seems to be limited in her ambulation because of bilateral leg pain with severe pain while walking. Also c/o severe heartburn that resolves immediately after taking omeprazole. She adamantly refuses to have an endoscopy. She takes NSAIDs for the knee pain and she understands that it will aggravate her GERD and is not recommended in pts with CAD. Denies chest pain, orthopnea or PND. \par Denies smoking, has social drinking. Denies the use of illicit drugs.\par \par

## 2019-10-30 NOTE — REVIEW OF SYSTEMS
[Shortness Of Breath] : shortness of breath [Dyspnea on exertion] : dyspnea during exertion [Heartburn] : heartburn [Negative] : Heme/Lymph

## 2019-11-04 RX ORDER — SACUBITRIL AND VALSARTAN 49; 51 MG/1; MG/1
49-51 TABLET, FILM COATED ORAL TWICE DAILY
Qty: 180 | Refills: 3 | Status: DISCONTINUED | COMMUNITY
Start: 2019-10-30 | End: 2019-11-04

## 2019-12-11 ENCOUNTER — APPOINTMENT (OUTPATIENT)
Dept: CARDIOLOGY | Facility: CLINIC | Age: 84
End: 2019-12-11
Payer: MEDICARE

## 2019-12-11 VITALS
HEART RATE: 56 BPM | DIASTOLIC BLOOD PRESSURE: 80 MMHG | OXYGEN SATURATION: 94 % | SYSTOLIC BLOOD PRESSURE: 138 MMHG | HEIGHT: 60 IN | BODY MASS INDEX: 33.18 KG/M2 | WEIGHT: 169 LBS

## 2019-12-11 PROCEDURE — 99214 OFFICE O/P EST MOD 30 MIN: CPT | Mod: 25

## 2019-12-11 RX ORDER — OXYBUTYNIN CHLORIDE 10 MG/1
10 TABLET, EXTENDED RELEASE ORAL
Refills: 0 | Status: DISCONTINUED | COMMUNITY
Start: 2019-06-05 | End: 2019-12-11

## 2019-12-11 NOTE — DISCUSSION/SUMMARY
[FreeTextEntry1] : 85 yo woman with known CAD and mild AS. Recent cath revealed mild irreg of the coronary arteries, mild AS (1.8 cm2), reduced EF (30-35%) and mild Pul HTN. Still symptomatic with NYHA 3/4. Started on Entresto on 10/2019 that she decided to stop because of "chills". She is bach on losartan. Symptomatic. \par Routine blood work was done recently. Results are not available yet. \par Will continue all other meds\par Follow up in 3 months

## 2019-12-11 NOTE — HISTORY OF PRESENT ILLNESS
[FreeTextEntry1] : 87 yo woman with known HTN, DM2 and hyperlipidemia all under medical management. Has known CAD since 12/2015 when she was admitted at [facility] with a myocardial infarction and underwent urgent coronary angiography and PCI to the LAD. Preserved LV function. No records available at the present time. Since then under medical follow up.\par Last echocardiogram was done in 6/2018 revealed moderate LV dysfunction with EF= 40-45% and moderate AS 1.16 cm2. \par On 7/12/2019 she had a Rt and Lt heart cath that revealed mild irregularities in the coronary arteries, patent stent in the LAD, reduced EF (30%), and mild aortic stenosis with a gradient of 9 mmHg and DANIELLE=1.8 cm2. Right heart pressures were mildly elevated (PAP=27 mmHg). \par Had BE cataract surgery with no complications. \par C/O effort related SOB (NYHA=3/4) but seems to be limited in her ambulation because of bilateral leg pain with severe pain while walking. During her last visit 10/2019 we started Entresto that she still refuses to take, she states that she had chills with it and refuses to take it. Denies orthopnea, PND, ankle swelling or palpitations. \par Also c/o severe heartburn that resolves immediately after taking omeprazole. She adamantly refuses to have an endoscopy. She takes NSAIDs for the knee pain and she understands that it will aggravate her GERD and is not recommended in pts with CAD. Denies chest pain, orthopnea or PND. \par Denies smoking, has social drinking. Denies the use of illicit drugs.\par Was prescribed oxycodone for knee pain but her PCP. Discussed risks and benefits.\par \par

## 2020-05-12 ENCOUNTER — OUTPATIENT (OUTPATIENT)
Dept: OUTPATIENT SERVICES | Facility: HOSPITAL | Age: 85
LOS: 1 days | End: 2020-05-12

## 2020-05-12 DIAGNOSIS — Z98.61 CORONARY ANGIOPLASTY STATUS: Chronic | ICD-10-CM

## 2020-06-16 ENCOUNTER — APPOINTMENT (OUTPATIENT)
Dept: CARDIOLOGY | Facility: CLINIC | Age: 85
End: 2020-06-16

## 2020-11-17 ENCOUNTER — APPOINTMENT (OUTPATIENT)
Dept: CARDIOLOGY | Facility: CLINIC | Age: 85
End: 2020-11-17
Payer: MEDICARE

## 2020-11-17 ENCOUNTER — NON-APPOINTMENT (OUTPATIENT)
Age: 85
End: 2020-11-17

## 2020-11-17 VITALS
DIASTOLIC BLOOD PRESSURE: 78 MMHG | WEIGHT: 160 LBS | HEART RATE: 66 BPM | TEMPERATURE: 98 F | SYSTOLIC BLOOD PRESSURE: 138 MMHG | OXYGEN SATURATION: 95 % | BODY MASS INDEX: 31.25 KG/M2

## 2020-11-17 PROCEDURE — 99214 OFFICE O/P EST MOD 30 MIN: CPT | Mod: 24

## 2020-11-17 PROCEDURE — 93000 ELECTROCARDIOGRAM COMPLETE: CPT

## 2020-11-17 PROCEDURE — 99072 ADDL SUPL MATRL&STAF TM PHE: CPT

## 2020-11-17 RX ORDER — PANTOPRAZOLE 40 MG/1
40 TABLET, DELAYED RELEASE ORAL
Qty: 30 | Refills: 0 | Status: DISCONTINUED | COMMUNITY
Start: 2020-06-06

## 2020-11-17 RX ORDER — ERGOCALCIFEROL 1.25 MG/1
1.25 MG CAPSULE, LIQUID FILLED ORAL
Qty: 8 | Refills: 1 | Status: DISCONTINUED | COMMUNITY
End: 2020-11-17

## 2020-11-17 NOTE — HISTORY OF PRESENT ILLNESS
[FreeTextEntry1] : 86 yo woman with known HTN, DM2 and hyperlipidemia all under medical management. Has known CAD since 12/2015 when she was admitted at [facility] with a myocardial infarction and underwent urgent coronary angiography and PCI to the LAD. Preserved LV function. No records available at the present time. Since then under medical follow up.\par Last echocardiogram was done in 6/2018 revealed moderate LV dysfunction with EF= 40-45% and moderate AS 1.16 cm2. \par On 7/12/2019 she had a Rt and Lt heart cath that revealed mild irregularities in the coronary arteries, patent stent in the LAD, reduced EF (30%), and mild aortic stenosis with a gradient of 9 mmHg and DANIELLE=1.8 cm2. Right heart pressures were mildly elevated (PAP=27 mmHg). \par Had BE cataract surgery with no complications. \par C/O effort related SOB (NYHA=3/4) but seems to be limited in her ambulation because of bilateral leg pain with severe pain while walking. Uses a cane. During her last visit 10/2019 we started Entresto that she still refuses to take, she states that she had chills with it and refuses to take it. Denies orthopnea, PND, ankle swelling or palpitations. \par Also c/o severe heartburn that resolves immediately after taking omeprazole. She adamantly refuses to have an endoscopy. She takes NSAIDs for the knee pain and she understands that it will aggravate her GERD and is not recommended in pts with CAD. Denies chest pain, orthopnea or PND. \par Denies smoking, has social drinking. Denies the use of illicit drugs.\par Was prescribed oxycodone for knee pain but her PCP. Discussed risks and benefits.\par \par

## 2020-11-17 NOTE — REASON FOR VISIT
[Follow-Up - Clinic] : a clinic follow-up of [Spouse] : spouse [FreeTextEntry1] : Follow up for HTN

## 2020-11-17 NOTE — ASSESSMENT
[FreeTextEntry1] : ECG performed today at the clinic revealed sinus rhythm 63 BPM, LPHB and RBBB with Q in V1-3

## 2020-11-17 NOTE — PHYSICAL EXAM
[General Appearance - Well Developed] : well developed [Normal Appearance] : normal appearance [General Appearance - Well Nourished] : well nourished [No Deformities] : no deformities [Normal Conjunctiva] : the conjunctiva exhibited no abnormalities [No Oral Pallor] : no oral pallor [Normal Jugular Venous V Waves Present] : normal jugular venous V waves present [Respiration, Rhythm And Depth] : normal respiratory rhythm and effort [Exaggerated Use Of Accessory Muscles For Inspiration] : no accessory muscle use [Auscultation Breath Sounds / Voice Sounds] : lungs were clear to auscultation bilaterally [Chest Palpation] : palpation of the chest revealed no abnormalities [Lungs Percussion] : the lungs were normal to percussion [Heart Rate And Rhythm] : heart rate and rhythm were normal [Heart Sounds] : normal S1 and S2 [Arterial Pulses Normal] : the arterial pulses were normal [Edema] : no peripheral edema present [Veins - Varicosity Changes] : no varicosital changes were noted in the lower extremities [Systolic grade ___/6] : A grade [unfilled]/6 systolic murmur was heard. [Bowel Sounds] : normal bowel sounds [Abdomen Soft] : soft [Abdomen Tenderness] : non-tender [Abdomen Mass (___ Cm)] : no abdominal mass palpated [Abdomen Hernia] : no hernia was discovered [Abnormal Walk] : normal gait [Nail Clubbing] : no clubbing of the fingernails [Cyanosis, Localized] : no localized cyanosis [Petechial Hemorrhages (___cm)] : no petechial hemorrhages [Nail Splinter Hemorrhages] : no splinter hemorrhages of the nails [Skin Color & Pigmentation] : normal skin color and pigmentation [Skin Turgor] : normal skin turgor [] : no rash [Oriented To Time, Place, And Person] : oriented to person, place, and time [Impaired Insight] : insight and judgment were intact [No Anxiety] : not feeling anxious [FreeTextEntry1] : Lasegue negative at 45 degrees

## 2020-11-17 NOTE — DISCUSSION/SUMMARY
[FreeTextEntry1] : 87 yo woman with known CAD and moderate aortic stenosis. Cardiac cath revealed mild irreg of the coronary arteries and DANIELLE=1.8 cm2, reduced EF (30-35%) and mild Pul HTN. Still symptomatic with NYHA 3/4. Patient is distraught because her  is at [facility] post cholecystotomy and lung cancer. 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 Results are not available yet. We haven't seen blood work since 9/2019.\par Will request an echocardiogram.\par Will continue all other meds\par Follow up in 3 months

## 2020-12-17 ENCOUNTER — APPOINTMENT (OUTPATIENT)
Dept: CARDIOLOGY | Facility: CLINIC | Age: 85
End: 2020-12-17

## 2021-03-30 ENCOUNTER — NON-APPOINTMENT (OUTPATIENT)
Age: 86
End: 2021-03-30

## 2021-03-30 ENCOUNTER — APPOINTMENT (OUTPATIENT)
Dept: CARDIOLOGY | Facility: CLINIC | Age: 86
End: 2021-03-30
Payer: MEDICARE

## 2021-03-30 VITALS
SYSTOLIC BLOOD PRESSURE: 121 MMHG | WEIGHT: 150 LBS | HEART RATE: 97 BPM | OXYGEN SATURATION: 98 % | BODY MASS INDEX: 29.45 KG/M2 | TEMPERATURE: 97.5 F | RESPIRATION RATE: 17 BRPM | HEIGHT: 60 IN | DIASTOLIC BLOOD PRESSURE: 85 MMHG

## 2021-03-30 PROCEDURE — 99215 OFFICE O/P EST HI 40 MIN: CPT

## 2021-03-30 PROCEDURE — 93000 ELECTROCARDIOGRAM COMPLETE: CPT

## 2021-03-30 RX ORDER — LOSARTAN POTASSIUM 50 MG/1
50 TABLET, FILM COATED ORAL
Qty: 90 | Refills: 1 | Status: DISCONTINUED | COMMUNITY
End: 2021-03-30

## 2021-03-30 NOTE — ASSESSMENT
[FreeTextEntry1] : ECG performed today at the clinic revealed A. Fib 136 x' LPHB and RBBB with Q in V1-3 which she didn’t have in the past.

## 2021-03-30 NOTE — PHYSICAL EXAM
[General Appearance - Well Developed] : well developed [Normal Appearance] : normal appearance [General Appearance - Well Nourished] : well nourished [No Deformities] : no deformities [Normal Conjunctiva] : the conjunctiva exhibited no abnormalities [No Oral Pallor] : no oral pallor [Normal Jugular Venous V Waves Present] : normal jugular venous V waves present [Respiration, Rhythm And Depth] : normal respiratory rhythm and effort [Exaggerated Use Of Accessory Muscles For Inspiration] : no accessory muscle use [Auscultation Breath Sounds / Voice Sounds] : lungs were clear to auscultation bilaterally [Chest Palpation] : palpation of the chest revealed no abnormalities [Lungs Percussion] : the lungs were normal to percussion [Heart Sounds] : normal S1 and S2 [Heart Rate And Rhythm] : heart rate and rhythm were normal [Arterial Pulses Normal] : the arterial pulses were normal [Edema] : no peripheral edema present [Veins - Varicosity Changes] : no varicosital changes were noted in the lower extremities [Systolic grade ___/6] : A grade [unfilled]/6 systolic murmur was heard. [Bowel Sounds] : normal bowel sounds [Abdomen Soft] : soft [Abdomen Tenderness] : non-tender [Abdomen Mass (___ Cm)] : no abdominal mass palpated [Abdomen Hernia] : no hernia was discovered [Abnormal Walk] : normal gait [Nail Clubbing] : no clubbing of the fingernails [Petechial Hemorrhages (___cm)] : no petechial hemorrhages [Cyanosis, Localized] : no localized cyanosis [Nail Splinter Hemorrhages] : no splinter hemorrhages of the nails [Skin Color & Pigmentation] : normal skin color and pigmentation [Skin Turgor] : normal skin turgor [] : no rash [Oriented To Time, Place, And Person] : oriented to person, place, and time [Impaired Insight] : insight and judgment were intact [No Anxiety] : not feeling anxious [FreeTextEntry1] : Lasegue negative at 45 degrees

## 2021-03-30 NOTE — HISTORY OF PRESENT ILLNESS
[FreeTextEntry1] : 86 yo woman,  from [place] that passed in Dec 2020 from lung cancer and COVID 2019. She has known HTN, DM2 and hyperlipidemia all under medical management. Has known CAD since 12/2015 when she was admitted at [facility] with a myocardial infarction and underwent urgent coronary angiography and PCI to the LAD. Preserved LV function. Since then under medical follow up.\par Last echocardiogram was done in 6/2018 revealed moderate LV dysfunction with EF= 40-45% and moderate AS 1.16 cm2. \par On 7/12/2019 she had a Rt and Lt heart cath that revealed mild irregularities in the coronary arteries, patent stent in the LAD, reduced EF (30%), and mild aortic stenosis with a gradient of 9 mmHg and DANIELLE=1.8 cm2. Right heart pressures were mildly elevated (PAP=27 mmHg). \par Had BE cataract surgery with no complications. \par C/O effort related SOB (NYHA=3/4) but seems to be limited in her ambulation because of bilateral leg pain with severe pain while walking. Uses a cane. During her visit 10/2019 we started Entresto that she still refuses to take, she states that she had chills with it and refuses to take it. \par Had COVID in Dec 2020. Still has not had the vaccine. \par Denies orthopnea, PND, ankle swelling or palpitations. \par Recent trip and fall at home and states that she has Rt leg pain since then, was seen at a PCP and had chest ray and states that she has two broken ribs.\par Also c/o severe heartburn that resolves immediately after taking omeprazole. She adamantly refuses to have an endoscopy. She takes NSAIDs for the knee pain and she understands that it will aggravate her GERD and is not recommended in pts with CAD. Denies chest pain, orthopnea or PND. \par Denies smoking, has social drinking. Denies the use of illicit drugs.\par Was prescribed oxycodone for knee pain but her PCP. Discussed risks and benefits.\par \par

## 2021-03-30 NOTE — DISCUSSION/SUMMARY
[FreeTextEntry1] : 86 yo woman with known CAD and moderate aortic stenosis. Cardiac cath revealed mild irreg of the coronary arteries and DANIELLE=1.8 cm2, reduced EF (30-35%) and mild Pul HTN. Still symptomatic with NYHA 3/4. Has been taking large amounts of NSAIDs since a recent fall and two broken ribs. \par Today C/O SOB and ECG revealed new A. Fib with RVR that was not present in the past. CHADS-VASC=7 (11% annual risk of stroke). Will start on Xarelto 20 mg/day\par Will start BB to slow HR\par Will start Entresto, she may be more receptive now\par Will consider D/C cardioversion in the future. 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

## 2021-03-31 ENCOUNTER — NON-APPOINTMENT (OUTPATIENT)
Age: 86
End: 2021-03-31

## 2021-04-01 ENCOUNTER — EMERGENCY (EMERGENCY)
Facility: HOSPITAL | Age: 86
LOS: 1 days | Discharge: HOME | End: 2021-04-01
Attending: EMERGENCY MEDICINE
Payer: MEDICARE

## 2021-04-01 VITALS
SYSTOLIC BLOOD PRESSURE: 116 MMHG | HEIGHT: 60 IN | OXYGEN SATURATION: 99 % | TEMPERATURE: 97 F | DIASTOLIC BLOOD PRESSURE: 79 MMHG | WEIGHT: 149.91 LBS | HEART RATE: 128 BPM | RESPIRATION RATE: 20 BRPM

## 2021-04-01 VITALS
RESPIRATION RATE: 18 BRPM | OXYGEN SATURATION: 100 % | TEMPERATURE: 98 F | SYSTOLIC BLOOD PRESSURE: 120 MMHG | HEART RATE: 88 BPM | DIASTOLIC BLOOD PRESSURE: 84 MMHG

## 2021-04-01 DIAGNOSIS — Z98.61 CORONARY ANGIOPLASTY STATUS: Chronic | ICD-10-CM

## 2021-04-01 LAB
ALBUMIN SERPL ELPH-MCNC: 4 G/DL — SIGNIFICANT CHANGE UP (ref 3.3–5.2)
ALP SERPL-CCNC: 78 U/L — SIGNIFICANT CHANGE UP (ref 40–120)
ALT FLD-CCNC: 30 U/L — SIGNIFICANT CHANGE UP
ANION GAP SERPL CALC-SCNC: 11 MMOL/L — SIGNIFICANT CHANGE UP (ref 5–17)
AST SERPL-CCNC: 26 U/L — SIGNIFICANT CHANGE UP
BILIRUB SERPL-MCNC: 0.6 MG/DL — SIGNIFICANT CHANGE UP (ref 0.4–2)
BUN SERPL-MCNC: 27 MG/DL — HIGH (ref 8–20)
CALCIUM SERPL-MCNC: 10.4 MG/DL — HIGH (ref 8.6–10.2)
CHLORIDE SERPL-SCNC: 103 MMOL/L — SIGNIFICANT CHANGE UP (ref 98–107)
CO2 SERPL-SCNC: 24 MMOL/L — SIGNIFICANT CHANGE UP (ref 22–29)
CREAT SERPL-MCNC: 0.93 MG/DL — SIGNIFICANT CHANGE UP (ref 0.5–1.3)
GLUCOSE SERPL-MCNC: 130 MG/DL — HIGH (ref 70–99)
MAGNESIUM SERPL-MCNC: 1.9 MG/DL — SIGNIFICANT CHANGE UP (ref 1.8–2.6)
NT-PROBNP SERPL-SCNC: 3062 PG/ML — HIGH (ref 0–300)
POTASSIUM SERPL-MCNC: 4.4 MMOL/L — SIGNIFICANT CHANGE UP (ref 3.5–5.3)
POTASSIUM SERPL-SCNC: 4.4 MMOL/L — SIGNIFICANT CHANGE UP (ref 3.5–5.3)
PROT SERPL-MCNC: 6.8 G/DL — SIGNIFICANT CHANGE UP (ref 6.6–8.7)
SARS-COV-2 RNA SPEC QL NAA+PROBE: SIGNIFICANT CHANGE UP
SODIUM SERPL-SCNC: 138 MMOL/L — SIGNIFICANT CHANGE UP (ref 135–145)
TROPONIN T SERPL-MCNC: 0.04 NG/ML — SIGNIFICANT CHANGE UP (ref 0–0.06)

## 2021-04-01 PROCEDURE — 85730 THROMBOPLASTIN TIME PARTIAL: CPT

## 2021-04-01 PROCEDURE — 80053 COMPREHEN METABOLIC PANEL: CPT

## 2021-04-01 PROCEDURE — 83735 ASSAY OF MAGNESIUM: CPT

## 2021-04-01 PROCEDURE — C8929: CPT

## 2021-04-01 PROCEDURE — 93306 TTE W/DOPPLER COMPLETE: CPT | Mod: 26

## 2021-04-01 PROCEDURE — U0003: CPT

## 2021-04-01 PROCEDURE — 36415 COLL VENOUS BLD VENIPUNCTURE: CPT

## 2021-04-01 PROCEDURE — 99285 EMERGENCY DEPT VISIT HI MDM: CPT | Mod: CS

## 2021-04-01 PROCEDURE — 93005 ELECTROCARDIOGRAM TRACING: CPT

## 2021-04-01 PROCEDURE — 71045 X-RAY EXAM CHEST 1 VIEW: CPT | Mod: 26

## 2021-04-01 PROCEDURE — 85025 COMPLETE CBC W/AUTO DIFF WBC: CPT

## 2021-04-01 PROCEDURE — 84443 ASSAY THYROID STIM HORMONE: CPT

## 2021-04-01 PROCEDURE — 71045 X-RAY EXAM CHEST 1 VIEW: CPT

## 2021-04-01 PROCEDURE — 93010 ELECTROCARDIOGRAM REPORT: CPT

## 2021-04-01 PROCEDURE — U0005: CPT

## 2021-04-01 PROCEDURE — 99284 EMERGENCY DEPT VISIT MOD MDM: CPT | Mod: 25

## 2021-04-01 PROCEDURE — 99283 EMERGENCY DEPT VISIT LOW MDM: CPT

## 2021-04-01 PROCEDURE — 83880 ASSAY OF NATRIURETIC PEPTIDE: CPT

## 2021-04-01 PROCEDURE — 85610 PROTHROMBIN TIME: CPT

## 2021-04-01 PROCEDURE — 84484 ASSAY OF TROPONIN QUANT: CPT

## 2021-04-01 RX ORDER — AMIODARONE HYDROCHLORIDE 400 MG/1
200 TABLET ORAL ONCE
Refills: 0 | Status: COMPLETED | OUTPATIENT
Start: 2021-04-01 | End: 2021-04-01

## 2021-04-01 RX ORDER — METOPROLOL TARTRATE 50 MG
50 TABLET ORAL ONCE
Refills: 0 | Status: COMPLETED | OUTPATIENT
Start: 2021-04-01 | End: 2021-04-01

## 2021-04-01 RX ORDER — AMIODARONE HYDROCHLORIDE 400 MG/1
1 TABLET ORAL
Qty: 30 | Refills: 0
Start: 2021-04-01

## 2021-04-01 RX ADMIN — Medication 50 MILLIGRAM(S): at 11:14

## 2021-04-01 RX ADMIN — AMIODARONE HYDROCHLORIDE 200 MILLIGRAM(S): 400 TABLET ORAL at 15:42

## 2021-04-01 NOTE — CONSULT NOTE ADULT - ATTENDING COMMENTS
87 year old female with a history of CAD, aortic stenosis and cardiomyopathy, presenting an atrial arrhythmia which is most consitent with atrial tachycardia- very frequent but mostly non-sustained runs. The patient appears to be symptomatic as a result. Recommend small dose amiodarone (200 mg bid for 3 days, then 200 mg daily). Dose of metoprolol should be cut in half. If no significant change on TTE from prior can be discharged home from our standpoint.

## 2021-04-01 NOTE — ED ADULT TRIAGE NOTE - CHIEF COMPLAINT QUOTE
pt here to be cardioverted, Dr Coulter said to come to the ED for procedure, c/o shortness of breathe

## 2021-04-01 NOTE — ED PROVIDER NOTE - PMH
Bilateral knee pain    CAD (coronary artery disease)    Diabetes mellitus    Excessive use of nonsteroidal anti-inflammatory drug (NSAID)    GERD (gastroesophageal reflux disease)    Grade II diastolic dysfunction    HLD (hyperlipidemia)    HTN (hypertension)    Moderate aortic stenosis    STEMI involving left anterior descending coronary artery  PCI [id number]

## 2021-04-01 NOTE — CONSULT NOTE ADULT - SUBJECTIVE AND OBJECTIVE BOX
87 year old female patient with a known past medical history of HTN, HLD, CAD s/p PCI 2015, moderate AS, DM, Diastolic CHF, and arthritis who initially presented to Dr. Humphreys's office yesterday after suffering palpitations and dyspnea since the passing of her son and  due to compilations of COVID. She reports she gets very short of breath with even small activities. She does admit to associated palpitations as well. She denies any glory syncope She does admit to a fall a few weeks ago which resulted in right rib fracture after the dog pulled her down on the lease. She denies chest pain however.     She admits to recent weight loss, and inability to sleep. She finds herself crying often and is anxious all the time. She does not have any significant support network here in [place]. She has a daughter with whom she is not terribly close with. Nearest blood relative in [place].     PAST MEDICAL & SURGICAL HISTORY:  STEMI involving left anterior descending coronary artery  PCI 2015  HTN (hypertension)  HLD (hyperlipidemia)  CAD (coronary artery disease)  Moderate aortic stenosis  GERD (gastroesophageal reflux disease)  Grade II diastolic dysfunction  Diabetes mellitus  Bilateral knee pain  Excessive use of nonsteroidal anti-inflammatory drug (NSAID)  History of percutaneous coronary intervention  LAD STEMI    REVIEW OF SYSTEMS  General: - fever or chills, +fatigue  Skin/Breast: - rashes  Ophthalmologic: - blurred vision	  ENMT: - sore throat  Respiratory and Thorax: + dysnpea, - cough  Cardiovascular: + palpitations, + HAQUE, - chest pain  Gastrointestinal: - N/V/D/C, + weight loss  Genitourinary: - dysuria  Musculoskeletal: + B/L knee arthritis  Neurological: - weaknesses  Psychiatric: ++anxiety and depression    MEDICATIONS  (STANDING):    Allergies  No Known Allergies    SOCIAL HISTORY: non-drinker, 2 cups coffee daily, never smoker    FAMILY HISTORY: - family history of arrhythmias     Vital Signs Last 24 Hrs  T(C): 36.3 (01 Apr 2021 09:05), Max: 36.3 (01 Apr 2021 09:05)  T(F): 97.4 (01 Apr 2021 09:05), Max: 97.4 (01 Apr 2021 09:05)  HR: 128 (01 Apr 2021 09:05) (128 - 128)  BP: 116/79 (01 Apr 2021 09:05) (116/79 - 116/79)  RR: 20 (01 Apr 2021 09:05) (20 - 20)  SpO2: 99% (01 Apr 2021 09:05) (99% - 99%)    Physical Exam:  Constitutional: AAOx3, NAD  Neck: supple, +JVD  Cardiovascular: +S1S2 RRR, AT at time of exam. 3/6 OPAL radiating to Right IJ  Pulmonary: CTA b/l, unlabored, no wheezes, rales. No rhonchi  Abdomen: +BS, soft NTND  Extremities: no edema b/l,   Neuro: non focal, speech clear, HOWELL x 4  Psych: severely anxious and depressed. Tearful    LABS:                        15.2   9.55  )-----------( 206      ( 01 Apr 2021 10:28 )             45.8     138  |  103  |  27.0<H>  ----------------------------<  130<H>  4.4   |  24.0  |  0.93    Ca    10.4<H>      01 Apr 2021 10:28  Mg     1.9     04-01  TPro  6.8  /  Alb  4.0  /  TBili  0.6  /  DBili  x   /  AST  26  /  ALT  30  /  AlkPhos  78  04-01  LIVER FUNCTIONS - ( 01 Apr 2021 10:28 )  Alb: 4.0 g/dL / Pro: 6.8 g/dL / ALK PHOS: 78 U/L / ALT: 30 U/L / AST: 26 U/L / GGT: x         PT/INR - ( 01 Apr 2021 10:28 )   PT: 23.8 sec;   INR: 2.13 ratio    PTT - ( 01 Apr 2021 10:28 )  PTT:35.5 secCARDIAC MARKERS ( 01 Apr 2021 10:28 )  x     / 0.04 ng/mL / x     / x     / x        RADIOLOGY & ADDITIONAL STUDIES:  TTE 6/13/2018  Summary:   1. Technically difficult study.   2. Entire apex and mid anterolateral segment are abnormal as described   above.   3. Mildly decreased global left ventricular systolic function.   4. Left ventricular ejection fraction, by visual estimation, is 40 to   45%.   5. Spectral Doppler shows pseudonormal pattern of left ventricular   myocardial filling (Grade II diastolic dysfunction).   6. Elevated left atrial and left ventricular end-diastolic pressures.   7. Severely enlarged left atrium.   8. The mitral valve leaflets are tethered which is due to reduced   systolic function and elevated LVEDP.   9. Peak transaortic gradient equals 19.1 mmHg, mean transaortic gradient   equals 11.0 mmHg, the calculated aortic valve area equals 1.16 cm² by the   continuity equation consistent with moderate aortic stenosis.    Cardiac Cath 7/12/19  DIAGNOSTIC IMPRESSIONS: There is mild irregularity of the coronary anatomy.  Patent stnet in Prox LAD Left ventricular function is abnormal.  LVEF=30%  Mild Elevation of Right Heart Pressures:  RA=12 mmHg; RV=51/15 mmHg; PCWP=19 mmHg  Mild Pulmonary HTN: 50/16 - 27 mmHg  Preserved CO (5.4 L/min) and CI (3.1 L/min/m2)  Preserved Cardiac Power=0.98 W  Mild to Aortic Stenosis (AVG=9 mmHg) (DANIELLE=1.8cm2)  DIAGNOSTIC RECOMMENDATIONS: Patient management should include aggressive  medical therapy, close monitoring of BUN and creatinine, resumption of all  previous activities in 5 days, and weight reduction. The patient should  follow a low fat and low calorie diet. Medical management is recommended.    Telemetry: Atrial tachycardia with rates in the 140-150s.     A/P  87 year old female patient with a known past medical history of HTN, HLD, CAD s/p PCI/ROHIT to pLAD x1 in 2015, moderate AS (DANIELLE 1.8cm2), DM, Diastolic CHF, NYHA Class III, and arthritis who presents with salvos of rapid atrial tachycardia with rates in the 150s    AT could be originating from [place] or with severe LAE, for right pulmonary veins  She expressed she does not want any invasive procedures at this time    - No indication for DCCV. Patient in and out of AT on telemetry  - Would start Amiodarone 200mg PO BID x 14 days and then reduce to daily therafter  - Had sinus bradycardia on old EKGs from office. Would cut beta blocker dose in half while starting Amiodarone  - TTE - Has moderate AS. No recent Echo to review.   - Previously refused Entresto due to side effects. Would continue losartan for now.   - Full recommendations to follow       87 year old female patient with a known past medical history of HTN, HLD, CAD s/p PCI 2015, moderate AS, DM, Diastolic CHF, and arthritis who initially presented to Dr. Humphreys's office yesterday after suffering palpitations and dyspnea since the passing of her son and  due to compilations of COVID. She reports she gets very short of breath with even small activities. She does admit to associated palpitations as well. She denies any glory syncope She does admit to a fall a few weeks ago which resulted in right rib fracture after the dog pulled her down on the lease. She denies chest pain however.     She admits to recent weight loss, and inability to sleep. She finds herself crying often and is anxious all the time. She does not have any significant support network here in [place]. She has a daughter with whom she is not terribly close with. Nearest blood relative in [place].     PAST MEDICAL & SURGICAL HISTORY:  STEMI involving left anterior descending coronary artery  PCI 2015  HTN (hypertension)  HLD (hyperlipidemia)  CAD (coronary artery disease)  Moderate aortic stenosis  GERD (gastroesophageal reflux disease)  Grade II diastolic dysfunction  Diabetes mellitus  Bilateral knee pain  Excessive use of nonsteroidal anti-inflammatory drug (NSAID)  History of percutaneous coronary intervention  LAD STEMI    REVIEW OF SYSTEMS  General: - fever or chills, +fatigue  Skin/Breast: - rashes  Ophthalmologic: - blurred vision	  ENMT: - sore throat  Respiratory and Thorax: + dysnpea, - cough  Cardiovascular: + palpitations, + HAQUE, - chest pain  Gastrointestinal: - N/V/D/C, + weight loss  Genitourinary: - dysuria  Musculoskeletal: + B/L knee arthritis  Neurological: - weaknesses  Psychiatric: ++anxiety and depression    MEDICATIONS  (STANDING):    Allergies  No Known Allergies    SOCIAL HISTORY: non-drinker, 2 cups coffee daily, never smoker    FAMILY HISTORY: - family history of arrhythmias     Vital Signs Last 24 Hrs  T(C): 36.3 (01 Apr 2021 09:05), Max: 36.3 (01 Apr 2021 09:05)  T(F): 97.4 (01 Apr 2021 09:05), Max: 97.4 (01 Apr 2021 09:05)  HR: 128 (01 Apr 2021 09:05) (128 - 128)  BP: 116/79 (01 Apr 2021 09:05) (116/79 - 116/79)  RR: 20 (01 Apr 2021 09:05) (20 - 20)  SpO2: 99% (01 Apr 2021 09:05) (99% - 99%)    Physical Exam:  Constitutional: AAOx3, NAD  Neck: supple, +JVD  Cardiovascular: +S1S2 RRR, AT at time of exam. 3/6 OPAL radiating to Right IJ  Pulmonary: CTA b/l, unlabored, no wheezes, rales. No rhonchi  Abdomen: +BS, soft NTND  Extremities: no edema b/l,   Neuro: non focal, speech clear, HOWELL x 4  Psych: severely anxious and depressed. Tearful    LABS:                        15.2   9.55  )-----------( 206      ( 01 Apr 2021 10:28 )             45.8     138  |  103  |  27.0<H>  ----------------------------<  130<H>  4.4   |  24.0  |  0.93    Ca    10.4<H>      01 Apr 2021 10:28  Mg     1.9     04-01  TPro  6.8  /  Alb  4.0  /  TBili  0.6  /  DBili  x   /  AST  26  /  ALT  30  /  AlkPhos  78  04-01  LIVER FUNCTIONS - ( 01 Apr 2021 10:28 )  Alb: 4.0 g/dL / Pro: 6.8 g/dL / ALK PHOS: 78 U/L / ALT: 30 U/L / AST: 26 U/L / GGT: x         PT/INR - ( 01 Apr 2021 10:28 )   PT: 23.8 sec;   INR: 2.13 ratio    PTT - ( 01 Apr 2021 10:28 )  PTT:35.5 secCARDIAC MARKERS ( 01 Apr 2021 10:28 )  x     / 0.04 ng/mL / x     / x     / x        RADIOLOGY & ADDITIONAL STUDIES:  TTE 6/13/2018  Summary:   1. Technically difficult study.   2. Entire apex and mid anterolateral segment are abnormal as described   above.   3. Mildly decreased global left ventricular systolic function.   4. Left ventricular ejection fraction, by visual estimation, is 40 to   45%.   5. Spectral Doppler shows pseudonormal pattern of left ventricular   myocardial filling (Grade II diastolic dysfunction).   6. Elevated left atrial and left ventricular end-diastolic pressures.   7. Severely enlarged left atrium.   8. The mitral valve leaflets are tethered which is due to reduced   systolic function and elevated LVEDP.   9. Peak transaortic gradient equals 19.1 mmHg, mean transaortic gradient   equals 11.0 mmHg, the calculated aortic valve area equals 1.16 cm² by the   continuity equation consistent with moderate aortic stenosis.    Cardiac Cath 7/12/19  DIAGNOSTIC IMPRESSIONS: There is mild irregularity of the coronary anatomy.  Patent stnet in Prox LAD Left ventricular function is abnormal.  LVEF=30%  Mild Elevation of Right Heart Pressures:  RA=12 mmHg; RV=51/15 mmHg; PCWP=19 mmHg  Mild Pulmonary HTN: 50/16 - 27 mmHg  Preserved CO (5.4 L/min) and CI (3.1 L/min/m2)  Preserved Cardiac Power=0.98 W  Mild to Aortic Stenosis (AVG=9 mmHg) (DANIELLE=1.8cm2)  DIAGNOSTIC RECOMMENDATIONS: Patient management should include aggressive  medical therapy, close monitoring of BUN and creatinine, resumption of all  previous activities in 5 days, and weight reduction. The patient should  follow a low fat and low calorie diet. Medical management is recommended.    Telemetry: Atrial tachycardia with rates in the 140-150s.     A/P  87 year old female patient with a known past medical history of HTN, HLD, CAD s/p PCI/ROHIT to pLAD x1 in 2015, moderate AS (DANIELLE 1.8cm2), DM, Diastolic CHF, NYHA Class III, and arthritis who presents with salvos of rapid atrial tachycardia with rates in the 150s    AT could be originating from [place] or with severe LAE, for right pulmonary veins  She expressed she does not want any invasive procedures at this time    - No indication for DCCV. Patient in and out of AT on telemetry  - Would start Amiodarone 200mg PO BID x 3 days and then reduce to 200mg daily thereafter  - Had sinus bradycardia on old EKGs from office. Would cut beta blocker dose in half to Lopressor 25mg PO BID  - TTE - results pending  - No obstruction for discharge from EP perspective. Discussed with Dr. Humphreys  - Discussed with Dr. Tanner

## 2021-04-01 NOTE — ED ADULT NURSE NOTE - OBJECTIVE STATEMENT
pt came in and stated she has been having sob and palpitations and stated she lost her  and son within the past 7 months. pt stated she has been depressed and cries all of the time. she denied si/hi skin is warm and dry throughout, neg swelling in LE

## 2021-04-01 NOTE — ED PROVIDER NOTE - CLINICAL SUMMARY MEDICAL DECISION MAKING FREE TEXT BOX
weeks of worsening exertional dyspnea with tachycardia that appears to switch from sinus to afib intermittently on monitor. labs, cxr, metoprolol, cardiology consulted.

## 2021-04-01 NOTE — ED PROVIDER NOTE - PROGRESS NOTE DETAILS
Tyrone: pt feeling well, pulse in 90's, EP/cards clearin gpt for outpt f/u. recommending amio and betablocker reduction, pt instructed of all results and medications. stable for d/c.

## 2021-04-01 NOTE — ED ADULT NURSE NOTE - NSIMPLEMENTINTERV_GEN_ALL_ED
Implemented All Fall Risk Interventions:  [place] to call system. Call bell, personal items and telephone within reach. Instruct patient to call for assistance. Room bathroom lighting operational. Non-slip footwear when patient is off stretcher. Physically safe environment: no spills, clutter or unnecessary equipment. Stretcher in lowest position, wheels locked, appropriate side rails in place. Provide visual cue, wrist band, yellow gown, etc. Monitor gait and stability. Monitor for mental status changes and reorient to person, place, and time. Review medications for side effects contributing to fall risk. Reinforce activity limits and safety measures with patient and family.

## 2021-04-01 NOTE — ED PROVIDER NOTE - PHYSICAL EXAMINATION
Gen: No acute distress, non toxic  HEENT: Mucous membranes moist, pink conjunctivae, EOMI  CV: irregular, tachy from ~100-135, nl s1/s2. ttp right chest wall.  Resp: faint occasional crackle b/l. , normal rate and effort  GI: Abdomen soft, NT, ND. No rebound, no guarding  : No CVAT  Neuro: A&O x 3, moving all 4 extremities  MSK: No spine or joint tenderness to palpation  Skin: No rashes. intact and perfused.

## 2021-04-01 NOTE — CHART NOTE - NSCHARTNOTEFT_GEN_A_CORE
JARED Note: JARED made aware by manager MARI Dougherty RN has contacted her for taxi voucher home. Pt with no transportation, insurance benefit or emergency contact to call at this time. Voucher to pt's address approved, JARED placed call to [employer], transport arranged, voucher for $65. Pt provided with voucher and escorted to lobby. No further SW services at this time

## 2021-04-01 NOTE — ED PROVIDER NOTE - OBJECTIVE STATEMENT
86 y/o female hx htn, dm, hld, cad with 1 stent in 2015, diastolic dysfunction and EF ~30% in 2019 sent by cardiology office Dr. Humphreys for weeks of worsening exertional dyspnea with irregular tachycardia ?possibly for cardioversion. Pt recently started on xarelto and switched from losartan to entresto 2 days ago after being seen in office. Reports no hx of arrhythmias in past. No f/c, cp, abd pain, cough or dizziness. No hx dvt/pe. Pt reports right rib fracture x2 1 month ago, still with some pain. Of note, pt reports lost  and son to covid in 7 month period, reports always being upset, no si/hi, and does not want psych/any meds, wants to "get through this herself."    ROS: No fever/chills. No eye pain/changes in vision, No ear pain/sore throat/dysphagia, No chest pain. No cough/. No abdominal pain, N/V/D, no black/bloody bm. No dysuria/frequency/discharge, No headache. No Dizziness.    No rashes or breaks in skin. No numbness/tingling/weakness.

## 2021-04-01 NOTE — ED PROVIDER NOTE - PATIENT PORTAL LINK FT
You can access the FollowMyHealth Patient Portal offered by [facility] by registering at the following website: [email or web address]. By joining [employer]’s FollowMyHealth portal, you will also be able to view your health information using other applications (apps) compatible with our system.

## 2021-04-01 NOTE — ED PROVIDER NOTE - NSFOLLOWUPINSTRUCTIONS_ED_ALL_ED_FT
Take amiodarone 200mg twice a day for 3 days, then take 200mg once a day from there out. Reduce the metoprolol from 50mg twice a day down to just 25mg twice a day (cut the 50mg tab in half). Follow up with cardiology.    Shortness of breath    Shortness of breath (dyspnea) means you have trouble breathing and could indicate a medical problem. Causes include lung disease, heart disease, low amount of red blood cells (anemia), poor physical fitness, being overweight, smoking, etc. Your health care provider today may not be able to find a cause for your shortness of breath after your exam. In this case, it is important to have a follow-up exam with your primary care physician as instructed. If medicines were prescribed, take them as directed for the full length of time directed. Refrain from tobacco products.    SEEK IMMEDIATE MEDICAL CARE IF YOU HAVE ANY OF THE FOLLOWING SYMPTOMS: worsening shortness of breath, chest pain, back pain, abdominal pain, fever, coughing up blood, lightheadedness/dizziness.

## 2021-04-14 ENCOUNTER — APPOINTMENT (OUTPATIENT)
Dept: CARDIOLOGY | Facility: CLINIC | Age: 86
End: 2021-04-14
Payer: MEDICARE

## 2021-04-14 ENCOUNTER — NON-APPOINTMENT (OUTPATIENT)
Age: 86
End: 2021-04-14

## 2021-04-14 VITALS
OXYGEN SATURATION: 98 % | HEART RATE: 69 BPM | TEMPERATURE: 97.9 F | DIASTOLIC BLOOD PRESSURE: 70 MMHG | BODY MASS INDEX: 28.66 KG/M2 | SYSTOLIC BLOOD PRESSURE: 122 MMHG | HEIGHT: 60 IN | WEIGHT: 146 LBS

## 2021-04-14 PROCEDURE — 93000 ELECTROCARDIOGRAM COMPLETE: CPT

## 2021-04-14 PROCEDURE — 99214 OFFICE O/P EST MOD 30 MIN: CPT

## 2021-04-14 NOTE — ASSESSMENT
[FreeTextEntry1] : ECG performed today at the clinic revealed  NSR 65 BPM occasional APBs. 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

## 2021-04-14 NOTE — HISTORY OF PRESENT ILLNESS
[FreeTextEntry1] : 88 yo woman,  from [place] that passed in Dec 2020 from lung cancer and COVID 2019. She has known HTN, DM2 and hyperlipidemia all under medical management. Has known CAD since 12/2015 when she was admitted at [facility] with a myocardial infarction and underwent urgent coronary angiography and PCI to the LAD. Preserved LV function. Since then under medical follow up.\par Last echocardiogram was done in 6/2018 revealed moderate LV dysfunction with EF= 40-45% and moderate AS 1.16 cm2. \par On 7/12/2019 she had a Rt and Lt heart cath that revealed mild irregularities in the coronary arteries, patent stent in the LAD, reduced EF (30%), and mild aortic stenosis with a gradient of 9 mmHg and DANIELLE=1.8 cm2. Right heart pressures were mildly elevated (PAP=27 mmHg). \par Had BE cataract surgery with no complications. \par Had COVID in Dec 2020. Still has not had the vaccine. \par On 3/2021 she had recurrent falls at home, was seen at a PCP and had chest ray and states that she has two broken ribs.\par On 3/30/2021 she was seen at the office with severe SOB. Was referred to the hospital where she was found to have Atrial tachycardia. Treated with amiodarone and and metoprolol. Echo done 4/1/2021 revealed severely reduced LV function 25%. \par Today at the office for follow up, denies CP, SOB, orthopnea or PND. Denies palpitations, dizziness or ankle swelling.\par Denies smoking, has social drinking. Denies the use of illicit drugs.\par Was prescribed oxycodone for knee pain but her PCP. Discussed risks and benefits.\par \par

## 2021-04-14 NOTE — DISCUSSION/SUMMARY
[FreeTextEntry1] : 86 yo woman with known CAD and moderate aortic stenosis. Cardiac cath in 7/2019 revealed mild irreg of the coronary arteries and DANIELLE=1.8 cm2, reduced EF (30-35%) and mild Pul HTN. Still symptomatic with NYHA 3/4. Has been taking large amounts of NSAIDs since a recent fall and two broken ribs. \par Recent visit to the office with acute decompensated heart failure and atrial tachycardia with rapid ventricular response. Was referred to the hospital and treated with amiodarone, Entresto, xarelto and metoprolol. Since then significant improvement in her symptoms. Now NYHA 2/4. 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

## 2021-04-14 NOTE — PHYSICAL EXAM
[General Appearance - Well Developed] : well developed [Normal Appearance] : normal appearance [General Appearance - Well Nourished] : well nourished [No Deformities] : no deformities [Normal Conjunctiva] : the conjunctiva exhibited no abnormalities [No Oral Pallor] : no oral pallor [Normal Jugular Venous V Waves Present] : normal jugular venous V waves present [Respiration, Rhythm And Depth] : normal respiratory rhythm and effort [Exaggerated Use Of Accessory Muscles For Inspiration] : no accessory muscle use [Auscultation Breath Sounds / Voice Sounds] : lungs were clear to auscultation bilaterally [Chest Palpation] : palpation of the chest revealed no abnormalities [Lungs Percussion] : the lungs were normal to percussion [Heart Rate And Rhythm] : heart rate and rhythm were normal [Heart Sounds] : normal S1 and S2 [Edema] : no peripheral edema present [Arterial Pulses Normal] : the arterial pulses were normal [Veins - Varicosity Changes] : no varicosital changes were noted in the lower extremities [Systolic grade ___/6] : A grade [unfilled]/6 systolic murmur was heard. [Bowel Sounds] : normal bowel sounds [Abdomen Soft] : soft [Abdomen Tenderness] : non-tender [Abdomen Mass (___ Cm)] : no abdominal mass palpated [Abdomen Hernia] : no hernia was discovered [Abnormal Walk] : normal gait [Nail Clubbing] : no clubbing of the fingernails [Cyanosis, Localized] : no localized cyanosis [Petechial Hemorrhages (___cm)] : no petechial hemorrhages [Nail Splinter Hemorrhages] : no splinter hemorrhages of the nails [Skin Color & Pigmentation] : normal skin color and pigmentation [Skin Turgor] : normal skin turgor [Oriented To Time, Place, And Person] : oriented to person, place, and time [] : no rash [Impaired Insight] : insight and judgment were intact [No Anxiety] : not feeling anxious [FreeTextEntry1] : Lasegue negative at 45 degrees

## 2021-07-14 ENCOUNTER — APPOINTMENT (OUTPATIENT)
Dept: CARDIOLOGY | Facility: CLINIC | Age: 86
End: 2021-07-14
Payer: MEDICARE

## 2021-07-14 ENCOUNTER — NON-APPOINTMENT (OUTPATIENT)
Age: 86
End: 2021-07-14

## 2021-07-14 VITALS
HEART RATE: 68 BPM | BODY MASS INDEX: 29.88 KG/M2 | WEIGHT: 153 LBS | DIASTOLIC BLOOD PRESSURE: 76 MMHG | TEMPERATURE: 98.2 F | OXYGEN SATURATION: 97 % | SYSTOLIC BLOOD PRESSURE: 128 MMHG

## 2021-07-14 PROCEDURE — 93000 ELECTROCARDIOGRAM COMPLETE: CPT

## 2021-07-14 PROCEDURE — 99213 OFFICE O/P EST LOW 20 MIN: CPT

## 2021-07-14 RX ORDER — AMIODARONE HYDROCHLORIDE 200 MG/1
200 TABLET ORAL DAILY
Qty: 90 | Refills: 3 | Status: DISCONTINUED | COMMUNITY
Start: 1900-01-01 | End: 2021-07-14

## 2021-07-14 NOTE — ASSESSMENT
[FreeTextEntry1] : ECG performed today at the clinic revealed  NSR 65 BPM occasional APBs. RBBB and LAHB with Q in V1-3.

## 2021-07-14 NOTE — PHYSICAL EXAM
[Well Developed] : well developed [Well Nourished] : well nourished [No Acute Distress] : no acute distress [Normal Conjunctiva] : normal conjunctiva [Normal Venous Pressure] : normal venous pressure [No Carotid Bruit] : no carotid bruit [Normal S1, S2] : normal S1, S2 [No Rub] : no rub [No Gallop] : no gallop [Clear Lung Fields] : clear lung fields [Good Air Entry] : good air entry [No Respiratory Distress] : no respiratory distress  [Soft] : abdomen soft [Non Tender] : non-tender [No Masses/organomegaly] : no masses/organomegaly [Normal Bowel Sounds] : normal bowel sounds [Normal Gait] : normal gait [No Edema] : no edema [No Cyanosis] : no cyanosis [No Clubbing] : no clubbing [No Varicosities] : no varicosities [No Rash] : no rash [No Skin Lesions] : no skin lesions [Moves all extremities] : moves all extremities [No Focal Deficits] : no focal deficits [Normal Speech] : normal speech [Alert and Oriented] : alert and oriented [Normal memory] : normal memory [de-identified] : OPAL 3/6, preserved S2

## 2021-07-14 NOTE — HISTORY OF PRESENT ILLNESS
[FreeTextEntry1] : 86 yo woman,  from [place] that passed in Dec 2020 from lung cancer and COVID 2019. She has known HTN, DM2 and hyperlipidemia all under medical management. Has known CAD since 12/2015 when she was admitted at [facility] with a myocardial infarction and underwent urgent coronary angiography and PCI to the LAD. Preserved LV function. Since then under medical follow up.\par Last echocardiogram was done in 6/2018 revealed moderate LV dysfunction with EF= 40-45% and moderate AS 1.16 cm2. \par On 7/12/2019 she had a Rt and Lt heart cath that revealed mild irregularities in the coronary arteries, patent stent in the LAD, reduced EF (30%), and mild aortic stenosis with a gradient of 9 mmHg and DANIELLE=1.8 cm2. Right heart pressures were mildly elevated (PAP=27 mmHg). \par Had BE cataract surgery with no complications. \par Had COVID in Dec 2020. Still has not had the vaccine. \par On 3/2021 she had recurrent falls at home, was seen at a PCP and had chest ray and states that she has two broken ribs.\par On 3/30/2021 she was seen at the office with severe SOB. Was referred to the hospital where she was found to have atrial tachycardia. Treated with amiodarone and and metoprolol. Echo done 4/1/2021 revealed severely reduced LV function 25%. \par Today at the office for follow up, denies CP, SOB, orthopnea or PND. Denies palpitations, dizziness or ankle swelling.\par Had COVID 19 in 1/2021. Has not received the vaccine. \par Denies smoking, has social drinking. Denies the use of illicit drugs.\par Was prescribed oxycodone for knee pain but her PCP. Discussed risks and benefits.\par \par

## 2021-07-14 NOTE — DISCUSSION/SUMMARY
[FreeTextEntry1] : 88 yo woman with known CAD and moderate aortic stenosis. Cardiac cath in 7/2019 revealed mild irreg of the coronary arteries and DANIELLE=1.8 cm2, reduced EF (30-35%) and mild Pul HTN. \par Recent visit to the office with acute decompensated heart failure and atrial tachycardia with rapid ventricular response. Was referred to the hospital and treated with amiodarone, Entresto, xarelto and metoprolol. Since then significant improvement in her symptoms. Now NYHA 2/4. Seems that amiodarone was D/Isra at some point. Today in NSR and will not restart amiodarone. Will continue all meds\par Still mourning the loss of her  and son.\par Will continue all other meds\par Follow up in 6 months

## 2021-10-04 ENCOUNTER — RX RENEWAL (OUTPATIENT)
Age: 86
End: 2021-10-04

## 2021-10-05 ENCOUNTER — RX RENEWAL (OUTPATIENT)
Age: 86
End: 2021-10-05

## 2021-10-13 ENCOUNTER — NON-APPOINTMENT (OUTPATIENT)
Age: 86
End: 2021-10-13

## 2021-10-13 RX ORDER — ATORVASTATIN CALCIUM 80 MG/1
80 TABLET, FILM COATED ORAL
Qty: 90 | Refills: 3 | Status: DISCONTINUED | COMMUNITY
End: 2021-10-13

## 2021-12-03 ENCOUNTER — OUTPATIENT (OUTPATIENT)
Dept: OUTPATIENT SERVICES | Facility: HOSPITAL | Age: 86
LOS: 1 days | End: 2021-12-03

## 2021-12-03 DIAGNOSIS — Z98.61 CORONARY ANGIOPLASTY STATUS: Chronic | ICD-10-CM

## 2022-02-02 ENCOUNTER — NON-APPOINTMENT (OUTPATIENT)
Age: 87
End: 2022-02-02

## 2022-02-02 ENCOUNTER — APPOINTMENT (OUTPATIENT)
Dept: CARDIOLOGY | Facility: CLINIC | Age: 87
End: 2022-02-02
Payer: MEDICARE

## 2022-02-02 VITALS
BODY MASS INDEX: 31.41 KG/M2 | WEIGHT: 160 LBS | RESPIRATION RATE: 19 BRPM | OXYGEN SATURATION: 100 % | TEMPERATURE: 97.4 F | HEIGHT: 60 IN | SYSTOLIC BLOOD PRESSURE: 118 MMHG | HEART RATE: 67 BPM | DIASTOLIC BLOOD PRESSURE: 60 MMHG

## 2022-02-02 PROCEDURE — 93000 ELECTROCARDIOGRAM COMPLETE: CPT

## 2022-02-02 PROCEDURE — 99214 OFFICE O/P EST MOD 30 MIN: CPT

## 2022-02-02 NOTE — ASSESSMENT
[FreeTextEntry1] : ECG performed today at the clinic revealed  NSR 62 BPM occasional APBs. RBBB and LAHB with Q in V1-3. No change.

## 2022-02-02 NOTE — REVIEW OF SYSTEMS
[SOB] : shortness of breath [Dyspnea on exertion] : dyspnea during exertion [Joint Pain] : joint pain [Joint Stiffness] : joint stiffness [Negative] : Heme/Lymph [Chest Discomfort] : no chest discomfort [Lower Ext Edema] : no extremity edema [Leg Claudication] : no intermittent leg claudication [Palpitations] : no palpitations [Orthopnea] : no orthopnea [Syncope] : no syncope [Joint Swelling] : no joint swelling [Myalgia] : no myalgia [FreeTextEntry8] : Nocturia [FreeTextEntry9] : BE knee arthritis

## 2022-02-02 NOTE — DISCUSSION/SUMMARY
[FreeTextEntry1] : 87 yo woman with known CAD and moderate aortic stenosis. Cardiac cath in 7/2019 revealed mild irreg of the coronary arteries and DANIELLE=1.8 cm2, reduced EF (30-35%) and mild Pul HTN. Last Echo in 4/2021 revealed severe LV dysfunction with an EF=25%, mild to moderate MR, and moderate AS (DANIELLE=1.25cm2) in the presence of severely reduced EF. \par Treated with Entresto, Xarelto and metoprolol. Today, she is C/O increasing effort related SOB NYHA 3-4/4. No evidence of arrhythmia. By auscultation, seems that the aortic stenosis is severe (No S2). \par Still mourning the loss of her  and son.\par Will continue same medications.\par Will perform am echo dobutamine to assess low CO/Low gradient AS and cardiac reserve for possible TAVR in view of the increasing SOB. \par Follow up in 1 month

## 2022-02-02 NOTE — HISTORY OF PRESENT ILLNESS
[FreeTextEntry1] : 87 yo woman,  from [place] that passed in Dec 2020 from lung cancer and COVID 2019. She has known HTN, DM2 and hyperlipidemia all under medical management. Has known CAD since 12/2015 when she was admitted at [facility] with a myocardial infarction and underwent urgent coronary angiography and PCI to the LAD. Preserved LV function. Since then under medical follow up.\par Last echocardiogram was done in 6/2018 revealed moderate LV dysfunction with EF= 40-45% and moderate AS 1.16 cm2. \par On 7/12/2019 she had a Rt and Lt heart cath that revealed mild irregularities in the coronary arteries, patent stent in the LAD, reduced EF (30%), and mild aortic stenosis with a gradient of 9 mmHg and DANIELLE=1.8 cm2. Right heart pressures were mildly elevated (PAP=27 mmHg). \par Had BE cataract surgery with no complications. \par Had COVID in Dec 2020. Still has not had the vaccine. \par On 3/2021 she had recurrent falls at home, was seen at a PCP and had chest ray and states that she has two broken ribs.\par On 3/30/2021 she was seen at the office with severe SOB. Was referred to the hospital where she was found to have atrial tachycardia. Treated with amiodarone and and metoprolol. Echo done 4/1/2021 revealed severely reduced LV function 25%. \par Today at the office for follow up, she C/O increasing SOB. NYHA 3/4 but she is limited by severe knee arthritis. No CP, orthopnea or PND. Denies palpitations, dizziness or ankle swelling.\par Had COVID 19 in 1/2021. Has not received the vaccine. \par Denies smoking, has social drinking. Denies the use of illicit drugs.\par Was prescribed oxycodone for knee pain but her PCP. Discussed risks and benefits.\par \par

## 2022-02-02 NOTE — PHYSICAL EXAM
[Well Developed] : well developed [Well Nourished] : well nourished [No Acute Distress] : no acute distress [Normal Conjunctiva] : normal conjunctiva [Normal Venous Pressure] : normal venous pressure [No Carotid Bruit] : no carotid bruit [Normal S1, S2] : normal S1, S2 [No Rub] : no rub [No Gallop] : no gallop [Clear Lung Fields] : clear lung fields [Good Air Entry] : good air entry [No Respiratory Distress] : no respiratory distress  [Soft] : abdomen soft [Non Tender] : non-tender [No Masses/organomegaly] : no masses/organomegaly [Normal Bowel Sounds] : normal bowel sounds [Normal Gait] : normal gait [No Edema] : no edema [No Cyanosis] : no cyanosis [No Clubbing] : no clubbing [No Varicosities] : no varicosities [No Rash] : no rash [No Skin Lesions] : no skin lesions [Moves all extremities] : moves all extremities [No Focal Deficits] : no focal deficits [Normal Speech] : normal speech [Alert and Oriented] : alert and oriented [Normal memory] : normal memory [de-identified] : OPAL 4/6 at LSB with absent S2. Radiates to the left carotid.

## 2022-02-15 ENCOUNTER — APPOINTMENT (OUTPATIENT)
Dept: CARDIOLOGY | Facility: CLINIC | Age: 87
End: 2022-02-15

## 2022-02-16 ENCOUNTER — APPOINTMENT (OUTPATIENT)
Dept: CARDIOLOGY | Facility: CLINIC | Age: 87
End: 2022-02-16
Payer: MEDICARE

## 2022-02-16 PROCEDURE — 93352 ADMIN ECG CONTRAST AGENT: CPT

## 2022-02-16 PROCEDURE — 93320 DOPPLER ECHO COMPLETE: CPT

## 2022-02-16 PROCEDURE — 93351 STRESS TTE COMPLETE: CPT

## 2022-02-23 ENCOUNTER — NON-APPOINTMENT (OUTPATIENT)
Age: 87
End: 2022-02-23

## 2022-03-02 ENCOUNTER — OUTPATIENT (OUTPATIENT)
Dept: OUTPATIENT SERVICES | Facility: HOSPITAL | Age: 87
LOS: 1 days | End: 2022-03-02

## 2022-03-02 DIAGNOSIS — E78.5 HYPERLIPIDEMIA, UNSPECIFIED: ICD-10-CM

## 2022-03-02 DIAGNOSIS — I10 ESSENTIAL (PRIMARY) HYPERTENSION: ICD-10-CM

## 2022-03-02 DIAGNOSIS — Z98.61 CORONARY ANGIOPLASTY STATUS: Chronic | ICD-10-CM

## 2022-03-02 DIAGNOSIS — E11.9 TYPE 2 DIABETES MELLITUS WITHOUT COMPLICATIONS: ICD-10-CM

## 2022-03-15 ENCOUNTER — APPOINTMENT (OUTPATIENT)
Dept: CARDIOTHORACIC SURGERY | Facility: CLINIC | Age: 87
End: 2022-03-15
Payer: MEDICARE

## 2022-03-15 VITALS
SYSTOLIC BLOOD PRESSURE: 125 MMHG | OXYGEN SATURATION: 98 % | RESPIRATION RATE: 16 BRPM | DIASTOLIC BLOOD PRESSURE: 85 MMHG | HEART RATE: 65 BPM

## 2022-03-15 PROCEDURE — 99205 OFFICE O/P NEW HI 60 MIN: CPT

## 2022-03-16 NOTE — DATA REVIEWED
[FreeTextEntry1] : Stress Echo from 2/23/22 \par Summary:\par 1. Excellent inotropic contractile reserve. Segmental wall motion abnormalities in LAD territory.\par 2. Scar in LAD territory. No ischemia in any other regions.\par 3. Moderate aortic stenosis is present.\par 4. Despite increase in stroke volume, gradients and DVI did not meet criteria for severe aortic stenosis.\par 5. Negative stress echo for ischemia.\par

## 2022-03-16 NOTE — ASSESSMENT
[FreeTextEntry1] : Stress Echocardiogram does not meet criteria for severe aortic stenosis. In order to surgically intervene we have to meet criteria for her to benefit from it. The stress echo was not overwhelming. There is no repeat imaging to determine her current ejection fraction. She has been placed on Entresto approximately one year ago and heart function is not established currently.\par \par Prior to establishing candidacy for TAVR she will require additional testing including a repeat Echocardiogram and Transesophageal Echocardiogram at [facility]. She will return after imaging.\par \par PLAN:\par - Transthoracic Echocardiogram \par - Transesophageal Echocardiogram at [facility] \par \par \par \par \par \par IIsmael NP am scribing for and in the presence of Dr. Ribera the following sections HISTORY OF PRESENT ILLNESS, PAST MEDICAL/FAMILY/SOCIAL HISTORY; REVIEW OF SYSTEMS; VITAL SIGNS; PHYSICAL EXAM; DISPOSITION.\par \par "I personally performed the services described in the documentation, reviewed the documentation recorded by the scribe in my presence and accurately and completely records my words and actions."\par

## 2022-03-16 NOTE — HISTORY OF PRESENT ILLNESS
[FreeTextEntry1] : Ms. ORTIZ is a 88 year old female referred by Dr. Humphreys who presents for consultation. Her past medical history includes recurrent falls, HTN, HLD, Diabetes, MI with PCI to LAD , HFrEF (EF 25% on Entresto), atrial tachycardia (Xarelto), and low gradient aortic stenosis. \par \par She has been followed by Cardiology for her increasing shortness of breath with heart failure. She has a history of MI and PCI with a noted  in EF over the past year. She was ordered for a dobutamine stress echo to assess for low flow aortic stenosis given her worsening in symptoms. There is moderate aortic stenosis on stress echo. She reports to the office today to discuss the possibility of TAVR. \par \lauren Fatigues easily, becomes short of breath on exertion. She lives alone in a condo and has little help available She ambulates with a cane.

## 2022-03-16 NOTE — PHYSICAL EXAM
[General Appearance - Well Nourished] : well nourished [General Appearance - Well Developed] : well developed [Sclera] : the sclera and conjunctiva were normal [Outer Ear] : the ears and nose were normal in appearance [Respiration, Rhythm And Depth] : normal respiratory rhythm and effort [Neck Appearance] : the appearance of the neck was normal [Auscultation Breath Sounds / Voice Sounds] : lungs were clear to auscultation bilaterally [Heart Rate And Rhythm] : heart rate was normal and rhythm regular [Examination Of The Chest] : the chest was normal in appearance [2+] : left 2+ [1+] : left 1+ [Abnormal Walk] : normal gait [Motor Tone] : muscle strength and tone were normal [Skin Color & Pigmentation] : normal skin color and pigmentation [Sensation] : the sensory exam was normal to light touch and pinprick [Motor Exam] : the motor exam was normal [Impaired Insight] : insight and judgment were intact [Oriented To Time, Place, And Person] : oriented to person, place, and time [FreeTextEntry1] : NYHAC II  Grade 3/6 systolic murmur

## 2022-03-16 NOTE — CONSULT LETTER
[Dear  ___] : Dear  [unfilled], [Consult Letter:] : I had the pleasure of evaluating your patient, [unfilled]. [Please see my note below.] : Please see my note below. [Consult Closing:] : Thank you very much for allowing me to participate in the care of this patient.  If you have any questions, please do not hesitate to contact me. [Sincerely,] : Sincerely, [FreeTextEntry2] : Maged Humphreys MD [FreeTextEntry3] : Kemal Ribera MD\par  of Cardiothoracic Surgery\par [facility]\par [address] \[place] [address]\par [phone]\par

## 2022-04-01 VITALS
HEIGHT: 60 IN | TEMPERATURE: 97 F | DIASTOLIC BLOOD PRESSURE: 74 MMHG | OXYGEN SATURATION: 99 % | RESPIRATION RATE: 20 BRPM | HEART RATE: 62 BPM | SYSTOLIC BLOOD PRESSURE: 154 MMHG | WEIGHT: 160.06 LBS

## 2022-04-01 RX ORDER — CHLORHEXIDINE GLUCONATE 213 G/1000ML
1 SOLUTION TOPICAL ONCE
Refills: 0 | Status: DISCONTINUED | OUTPATIENT
Start: 2022-04-04 | End: 2022-04-18

## 2022-04-01 NOTE — H&P PST ADULT - ASSESSMENT
ASSESSMENT:89 y/o F with C/O worsening SOB presents for YOLANDA  for evaluation of AS and possibility of TAVR  -NPO confirmed  -YOLANDA as ordered  -Procedure discussed with patient; risks and benefits explained; questions answered  -Labs and ECG reviewed

## 2022-04-01 NOTE — H&P PST ADULT - NSANTHOSAYNRD_GEN_A_CORE
No. LOU screening performed.  STOP BANG Legend: 0-2 = LOW Risk; 3-4 = INTERMEDIATE Risk; 5-8 = HIGH Risk

## 2022-04-01 NOTE — H&P PST ADULT - ALCOHOL USE HISTORY SINGLE SELECT
Have Your Spot(S) Been Treated In The Past?: has not been treated
Hpi Title: Evaluation of Skin Lesions
interest in learning
yes...

## 2022-04-01 NOTE — H&P PST ADULT - HISTORY OF PRESENT ILLNESS
Department of Cardiology                                                                  [facility]/[place] [address] [address]                                                            Telephone: [phone]. Fax:[phone]                                                                                     Pre-YOLANDA Note        Narrative:  89 y/o F with PMH HTN, HLD, DM, Afib on Xarelto, CAD S/P MI PCI to LAD 2018, HFrEF EF 25% and low gradient aortic stenosis who presents for YOLANDA for further evaluation and possibility of TAVR.    ASA and Mallampati: Per Anesthesia    	  ROS: + fatigue, + SOB, unstaedy gait, reflux        PHYSICAL EXAM:    T(C): --  HR: --  BP: --  RR: --  SpO2: --  Wt(kg): --    I&O's Summary      Daily     Daily     Constitutional: A & O x 3  HEENT:   Normal oral mucosa, PERRL, EOMI	  Cardiovascular: Normal S1 S2, No JVD, No murmurs, No edema  Respiratory: Lungs clear to auscultation	  Gastrointestinal:  Soft, Non-tender, + BS	  Skin: No rashes, No ecchymoses, No cyanosis  Neurologic: Non-focal  Extremities: Normal range of motion, No clubbing, cyanosis or edema  Vascular: Peripheral pulses palpable 2+ bilaterally    TELEMETRY: 	    ECG:  	  RADIOLOGY:     DIAGNOSTIC TESTING:  [x ] Echocardiogram:  < from: TTE Echo Complete w/ Contrast w/ Doppler (21 @ 12:21) >  EXAM:  ECHO TTE WITH CON COMP W DOPP      PROCEDURE DATE:  2021   .      INTERPRETATION:  REPORT:  TRANSTHORACIC ECHOCARDIOGRAM REPORT        Patient Name:   EDWARDO ORTIZ Patient Location: Emergency  Medical Rec #:  [id number]          Accession #:      [id number]  Account #:      [id number]          Height:           59.8 in 152.0 cm  YOB: 1933        Weight:           149.9 lb 68.00 kg  Patient Age:    87 years          BSA:              1.65 m²  Patient Gender: F          BP:               116/79 mmHg      Date of Exam:        2021 12:21:52 PM  Sonographer:         Bruno May  Referring Physician: Maged Humphreys MD    Procedure:     2D Echo/Doppler/Color Doppler Complete.  Indications:   Abnormal electrocardiogram [ECG] [EKG] - R94.31  Diagnosis:     Abnormal electrocardiogram [ECG] [EKG] - R94.31  Study Details: Technically fair study. Study quality was adversely affected due                 to arrhythmia and body habitus.        2D AND M-MODE MEASUREMENTS (normal ranges within parentheses):  Left Ventricle:                  Normal   Aorta/Left Atrium:            Normal  IVSd (2D):              0.83 cm (0.7-1.1) LA Volume Index    59.7 ml/m²  LVPWd (2D):             0.91 cm (0.7-1.1) Right Ventricle:  LVIDd (2D):             5.40 cm (3.4-5.7) RVd (2D):        3.03 cm  LVIDs (2D):             4.66 cm  LV FS (2D):             13.7 %   (>25%)  Relative Wall Thickness  0.34    (<0.42)    LV DIASTOLIC FUNCTION:  MV Peak E: 0.89 m/s  MV Peak A: 1.02 m/s  E/A Ratio: 0.88    SPECTRAL DOPPLER ANALYSIS (where applicable):  Aortic Valve: AoV Max Tanner: 2.00 m/s AoV Peak PG: 15.9 mmHg AoV Mean P.3 mmHg    LVOT Vmax: 0.49 m/s LVOT VTI: 0.102 m LVOT Diameter: 2.14 cm    AoV Area, Vmax: 0.89 cm² AoV Area, VTI: 1.25 cm² AoV Area, Vmn: 0.96 cm²  Ao VTI: 0.294  Tricuspid Valve and PA/RV Systolic Pressure: TR Max Velocity: 2.82 m/s RA Pressure: 3 mmHg RVSP/PASP: 34.8 mmHg      PHYSICIAN INTERPRETATION:  Left Ventricle: The left ventricular internal cavity size is mildly increased.  Global LV systolic function was severely decreased. Left ventricular ejection fraction, by visual estimation, is 25%. The left ventricular diastolic function could not be assessed in this study.  Right Ventricle: Normal right ventricular size and function.  Left Atrium: Severely enlarged left atrium.  Right Atrium: Mildly enlarged right atrium.  Pericardium: There is no evidence of pericardial effusion.  Mitral Valve: The mitral valve leaflets are tethered which is due to reduced systolic function and elevated LVDP. There is mild mitral annular calcification. Mild to moderate mitral valve regurgitation is seen. The MR jet is anteriorly-directed.  Tricuspid Valve: Mild tricuspid regurgitation is visualized.  Aortic Valve: The aortic valve is trileaflet. Peak transaortic gradient equals 15.9 mmHg, mean transaortic gradient equals 6.3 mmHg, the calculated aortic valve area equals 1.25 cm² by the continuity equation consistent with moderate aortic stenosis.Mild aortic valve regurgitation is seen. The Dimesionless Index value is .35.  Pulmonic Valve: No indication of pulmonic valve regurgitation.  Aorta: Aortic root measured at Sinus of Valsalva is normal. There is mild aortic root calcification.  Pulmonary Artery: The pulmonary artery is of normal size and origin.  Venous: The inferior vena cava is normal.  In comparison to the previous echocardiogram(s): The left ventricular function is worse.      Summary:   1. Technically fair study.   2. Severely decreased global left ventricular systolic function.   3. Left ventricular ejection fraction, by visual estimation, is 25%.   4. Severely enlarged left atrium.   5. The left ventricular diastolic function could not be assessed in this study.   6. Mildly enlarged right atrium.   7. Mild mitral annular calcification.   8. Mild to moderate mitral valve regurgitation.   9. The mitral valve leaflets are tethered which is due to reduced systolic function and elevated LVDP.  10. Mild tricuspid regurgitation.  11. Mild aortic regurgitation.  12. Peak transaortic gradient equals 15.9 mmHg, mean transaortic gradient equals 6.3 mmHg, the calculated aortic valve area equals 1.25 cm² by the continuity equation consistent with moderate aortic stenosis. The Dimesionless Index value is .35. Degree of aortic valve stenosis could be overestimated due to low LVEF.  13. There is mild aortic root calcification.  14. There is no evidence of pericardial effusion.  15. NEETU Humphreys.      < end of copied text >    [ ]  Catheterization:  [x ] Stress Echo: 22 Excellent inotropic contractile reserve. Segmental wall motion abnormalities in LAD territory. Scar in LAD territory. No ishemia in other regions. Moderate AS present. Gradients and DVI did not meet criteria for severe AS      LABS:	 	    CARDIAC MARKERS:                    proBNP:   Lipid Profile:   HgA1c:   TSH:     ASSESSMENT:89 y/o F with C/O worsening SOB presents for YOLANDA  for evaluation of AS and possibility of TAVR  -NPO confirmed  -YOLANDA as ordered  -Procedure discussed with patient; risks and benefits explained; questions answered  -Labs and ECG reviewed                                                                                                                                                   Pre-YOLANDA Note        Narrative:  89 y/o F with PMH HTN, HLD, DM, Afib on Xarelto, CAD S/P MI PCI to LAD 2018, HFrEF EF 25% and low gradient aortic stenosis who presents for YOLANDA for further evaluation and possibility of TAVR.    ASA and Mallampati: Per Anesthesia    	  ROS: + fatigue, + SOB, unstaedy gait, reflux    Constitutional: A & O x 3  HEENT:   Normal oral mucosa, PERRL, EOMI	  Cardiovascular: Normal S1 S2, No JVD, No murmurs, No edema  Respiratory: Lungs clear to auscultation	  Gastrointestinal:  Soft, Non-tender, + BS	  Skin: No rashes, No ecchymoses, No cyanosis  Neurologic: Non-focal  Extremities: Normal range of motion, No clubbing, cyanosis or edema  Vascular: Peripheral pulses palpable 2+ bilaterally    DIAGNOSTIC TESTING:  [x ] Echocardiogram:  < from: TTE Echo Complete w/ Contrast w/ Doppler (21 @ 12:21) >  EXAM:  ECHO TTE WITH CON COMP W DOPP      PROCEDURE DATE:  2021   .      INTERPRETATION:  REPORT:  TRANSTHORACIC ECHOCARDIOGRAM REPORT        Patient Name:   EDWARDO ORTIZ Patient Location: [facility] Rec #:  [id number]          Accession #:      [id number]  Account #:      [id number]          Height:           59.8 in 152.0 cm  YOB: 1933        Weight:           149.9 lb 68.00 kg  Patient Age:    87 years          BSA:              1.65 m²  Patient Gender: F          BP:               116/79 mmHg      Date of Exam:        2021 12:21:52 PM  Sonographer:         Bruno May  Referring Physician: Maged Humphreys MD    Procedure:     2D Echo/Doppler/Color Doppler Complete.  Indications:   Abnormal electrocardiogram [ECG] [EKG] - R94.31  Diagnosis:     Abnormal electrocardiogram [ECG] [EKG] - R94.31  Study Details: Technically fair study. Study quality was adversely affected due                 to arrhythmia and body habitus.        2D AND M-MODE MEASUREMENTS (normal ranges within parentheses):  Left Ventricle:                  Normal   Aorta/Left Atrium:            Normal  IVSd (2D):              0.83 cm (0.7-1.1) LA Volume Index    59.7 ml/m²  LVPWd (2D):             0.91 cm (0.7-1.1) Right Ventricle:  LVIDd (2D):             5.40 cm (3.4-5.7) RVd (2D):        3.03 cm  LVIDs (2D):             4.66 cm  LV FS (2D):             13.7 %   (>25%)  Relative Wall Thickness  0.34    (<0.42)    LV DIASTOLIC FUNCTION:  MV Peak E: 0.89 m/s  MV Peak A: 1.02 m/s  E/A Ratio: 0.88    SPECTRAL DOPPLER ANALYSIS (where applicable):  Aortic Valve: AoV Max Tanner: 2.00 m/s AoV Peak PG: 15.9 mmHg AoV Mean P.3 mmHg    LVOT Vmax: 0.49 m/s LVOT VTI: 0.102 m LVOT Diameter: 2.14 cm    AoV Area, Vmax: 0.89 cm² AoV Area, VTI: 1.25 cm² AoV Area, Vmn: 0.96 cm²  Ao VTI: 0.294  Tricuspid Valve and PA/RV Systolic Pressure: TR Max Velocity: 2.82 m/s RA Pressure: 3 mmHg RVSP/PASP: 34.8 mmHg      PHYSICIAN INTERPRETATION:  Left Ventricle: The left ventricular internal cavity size is mildly increased.  Global LV systolic function was severely decreased. Left ventricular ejection fraction, by visual estimation, is 25%. The left ventricular diastolic function could not be assessed in this study.  Right Ventricle: Normal right ventricular size and function.  Left Atrium: Severely enlarged left atrium.  Right Atrium: Mildly enlarged right atrium.  Pericardium: There is no evidence of pericardial effusion.  Mitral Valve: The mitral valve leaflets are tethered which is due to reduced systolic function and elevated LVDP. There is mild mitral annular calcification. Mild to moderate mitral valve regurgitation is seen. The MR jet is anteriorly-directed.  Tricuspid Valve: Mild tricuspid regurgitation is visualized.  Aortic Valve: The aortic valve is trileaflet. Peak transaortic gradient equals 15.9 mmHg, mean transaortic gradient equals 6.3 mmHg, the calculated aortic valve area equals 1.25 cm² by the continuity equation consistent with moderate aortic stenosis.Mild aortic valve regurgitation is seen. The Dimesionless Index value is .35.  Pulmonic Valve: No indication of pulmonic valve regurgitation.  Aorta: Aortic root measured at Sinus of Valsalva is normal. There is mild aortic root calcification.  Pulmonary Artery: The pulmonary artery is of normal size and origin.  Venous: The inferior vena cava is normal.  In comparison to the previous echocardiogram(s): The left ventricular function is worse.      Summary:   1. Technically fair study.   2. Severely decreased global left ventricular systolic function.   3. Left ventricular ejection fraction, by visual estimation, is 25%.   4. Severely enlarged left atrium.   5. The left ventricular diastolic function could not be assessed in this study.   6. Mildly enlarged right atrium.   7. Mild mitral annular calcification.   8. Mild to moderate mitral valve regurgitation.   9. The mitral valve leaflets are tethered which is due to reduced systolic function and elevated LVDP.  10. Mild tricuspid regurgitation.  11. Mild aortic regurgitation.  12. Peak transaortic gradient equals 15.9 mmHg, mean transaortic gradient equals 6.3 mmHg, the calculated aortic valve area equals 1.25 cm² by the continuity equation consistent with moderate aortic stenosis. The Dimesionless Index value is .35. Degree of aortic valve stenosis could be overestimated due to low LVEF.  13. There is mild aortic root calcification.  14. There is no evidence of pericardial effusion.  15. DW Dr. Humphreys.      < end of copied text >    [ ]  Catheterization:  < from: Cardiac Cath Lab - Adult (19 @ 08:54) >  CORONARY VESSELS: The coronary circulation is left dominant.  LM:   --  LM: Normal. The vessel was normal sized, not calcified, and not  tortuous. Angiography showed no evidence of disease.  LAD:   --  LAD: Normal. The vessel was normalsized, not calcified, and not  tortuous. Angiography showed minor luminal irregularities with no flow  limiting lesions.  Patent stent in the prox LAD.  CX:   --  Circumflex: Normal. The vessel was normal sized, not calcified,  and not tortuous. Angiography showed minor luminal irregularities with no  flow limiting lesions.  RCA:   --  RCA: The vessel was small (non-dominant), not calcified, and not  tortuous. Angiography showed minor luminal irregularities with no flow  limiting lesions.  COMPLICATIONS: There were no complications. No complications occurred  during the cath lab visit.  DIAGNOSTIC IMPRESSIONS: There is mild irregularity of the coronary anatomy.  Patent stnet in Prox LAD Left ventricular function is abnormal.  LVEF=30%  Mild Elevation of Right Heart Pressures:  RA=12 mmHg; RV=51/15 mmHg; PCWP=19 mmHg  Mild Pulmonary HTN: 50/16 - 27 mmHg  Preserved CO (5.4 L/min) and CI (3.1 L/min/m2)  Preserved Cardiac Power=0.98 W  Mild to Aortic Stenosis (AVG=9 mmHg) (DANIELLE=1.8cm2)  DIAGNOSTIC RECOMMENDATIONS: Patient management should include aggressive  medical therapy, close monitoring of BUN and creatinine, resumption of all  previous activities in 5 days, and weight reduction. The patient should  follow a low fat and low calorie diet. Medical management is recommended.  Prepared and signed by  Maged Humphreys MD  Signed 2019 10:26:00    < end of copied text >      [x ] Stress Echo: 22 Excellent inotropic contractile reserve. Segmental wall motion abnormalities in LAD territory. Scar in LAD territory. No ishemia in other regions. Moderate AS present. Gradients and DVI did not meet criteria for severe AS

## 2022-04-01 NOTE — H&P PST ADULT - CONTRAINDICATIONS ACEI/ARB MEDS
ASSESSMENT/PLAN:      16 y o  male with a right index finger and small finger sprain  Due to continued pain a MRI of the right hand was ordered today  May continue to ralph tape the index finger  Follow up in the office once the MRI is complete  He has been non compliant with treatment thus far  The patient verbalized understanding of exam findings and treatment plan  We engaged in the shared decision-making process and treatment options were discussed at length with the patient  Surgical and conservative management discussed today along with risks and benefits  Diagnoses and all orders for this visit:    Sprain of metacarpophalangeal (MCP) joint of right index finger, subsequent encounter  -     MRI hand right wo contrast; Future    Sprain of right little finger, unspecified site of digit, initial encounter  -     MRI hand right wo contrast; Future      Follow Up:  Return after MRI  To Do Next Visit:  Re-evaluation of current issue    ____________________________________________________________________________________________________________________________________________      CHIEF COMPLAINT:  Chief Complaint   Patient presents with    Right Index Finger - Pain, Follow-up       SUBJECTIVE:  Franci Mcdaniels is a 16y o  year old  male who presents to the office today for a follow up regarding a right index finger sprain  Aprox  2 months ago Balbina Garner was playing basketball when he caught his index finger on the rim  He did not get the ralph straps from therapy  Balbina Garner states that since his last visit he did re-injure the finger in the basketball rim again  He notes a blood blister  Balbinaandrew Garner continues to use the hand, but states he has not been using it as much  He also notes that his small finger is still painful after a forceful flexion injury in the summer  Overall pain has partially improved  I have personally reviewed all the relevant PMH, PSH, SH, FH, Medications and allergies       PAST MEDICAL HISTORY:  Past Medical History:   Diagnosis Date    Asthma     Knee injuries     Shoulder arthralgia     left       PAST SURGICAL HISTORY:  Past Surgical History:   Procedure Laterality Date    FL INJECTION LEFT SHOULDER (ARTHROGRAM)  6/18/2019    OR SHLDR ARTHROSCOP,SURG,REPAIR,SLAP LESION Left 8/8/2019    Procedure: LEFT SHOULDER ARTHROSCOPY WITH SLAP  LABRAL REPAIR;  Surgeon: Meghan Guerra MD;  Location: [address] [place] [address];  Service: Orthopedics       FAMILY HISTORY:  History reviewed  No pertinent family history  SOCIAL HISTORY:  Social History     Tobacco Use    Smoking status: Never Smoker    Smokeless tobacco: Never Used   Substance Use Topics    Alcohol use: Never     Frequency: Never    Drug use: No       MEDICATIONS:    Current Outpatient Medications:     ALBUTEROL IN, Inhale daily as needed , Disp: , Rfl:     Multiple Vitamin (MULTIVITAMIN) tablet, Take 1 tablet by mouth daily, Disp: , Rfl:     ALLERGIES:  No Known Allergies    REVIEW OF SYSTEMS:  Review of Systems   Constitutional: Negative for chills, fever and unexpected weight change  HENT: Negative for hearing loss, nosebleeds and sore throat  Eyes: Negative for pain, redness and visual disturbance  Respiratory: Negative for cough, shortness of breath and wheezing  Cardiovascular: Negative for chest pain, palpitations and leg swelling  Gastrointestinal: Negative for abdominal pain, nausea and vomiting  Endocrine: Negative for polydipsia and polyuria  Genitourinary: Negative for difficulty urinating and hematuria  Musculoskeletal: Positive for arthralgias  Negative for joint swelling and myalgias  Skin: Negative for rash and wound  Neurological: Negative for dizziness, numbness and headaches  Psychiatric/Behavioral: Negative for decreased concentration, dysphoric mood and suicidal ideas  The patient is not nervous/anxious          VITALS:  Vitals:    04/16/21 1024   BP: (!) 135/79   Pulse: (!) 51       LABS:  HgA1c: No results found for: HGBA1C  BMP:   Lab Results   Component Value Date    CALCIUM 9 1 07/22/2019    K 4 4 07/22/2019    CO2 29 07/22/2019     07/22/2019    BUN 11 07/22/2019    CREATININE 0 90 07/22/2019       _____________________________________________________  PHYSICAL EXAMINATION:  General: well developed and well nourished, alert, oriented times 3 and appears comfortable  Psychiatric: Normal  HEENT: Normocephalic, Atraumatic Trachea Midline, No torticollis  Pulmonary: No audible wheezing or respiratory distress   Cardiovascular: No pitting edema, 2+ radial pulse   Skin: No masses, erythema, lacerations, fluctation, ulcerations  Neurovascular: Sensation Intact to the Median, Ulnar, Radial Nerve, Motor Intact to the Median, Ulnar, Radial Nerve and Pulses Intact  Musculoskeletal: Normal, except as noted in detailed exam and in HPI        MUSCULOSKELETAL EXAMINATION:    Right index finger:     No erythema, ecchymosis or edema   Full flexion and extension   Full finger ROM    Pain over radial side of MCP joint with hyperextension of the MCP joint   Sensation intact to light touch   Brisk capillary refill     ___________________________________________________  STUDIES REVIEWED:  No new imaging to review           PROCEDURES PERFORMED:  Procedures  No Procedures performed today    _____________________________________________________      Cyndie Limb    I,:  Rakesh Mendez am acting as a scribe while in the presence of the attending physician :       I,:  Nolan Gonsalez DO personally performed the services described in this documentation    as scribed in my presence : on Entresto

## 2022-04-01 NOTE — H&P PST ADULT - NEGATIVE NEUROLOGICAL SYMPTOMS
no transient paralysis/no weakness/no paresthesias/no generalized seizures/no focal seizures/no syncope/no tremors/no headache

## 2022-04-04 ENCOUNTER — TRANSCRIPTION ENCOUNTER (OUTPATIENT)
Age: 87
End: 2022-04-04

## 2022-04-04 ENCOUNTER — OUTPATIENT (OUTPATIENT)
Dept: OUTPATIENT SERVICES | Facility: HOSPITAL | Age: 87
LOS: 1 days | End: 2022-04-04
Payer: MEDICARE

## 2022-04-04 VITALS
DIASTOLIC BLOOD PRESSURE: 74 MMHG | SYSTOLIC BLOOD PRESSURE: 154 MMHG | HEIGHT: 60 IN | HEART RATE: 66 BPM | OXYGEN SATURATION: 100 % | WEIGHT: 160.28 LBS | RESPIRATION RATE: 17 BRPM | TEMPERATURE: 98 F

## 2022-04-04 DIAGNOSIS — I35.0 NONRHEUMATIC AORTIC (VALVE) STENOSIS: ICD-10-CM

## 2022-04-04 DIAGNOSIS — Z98.61 CORONARY ANGIOPLASTY STATUS: Chronic | ICD-10-CM

## 2022-04-04 LAB
ALBUMIN SERPL ELPH-MCNC: 4.6 G/DL — SIGNIFICANT CHANGE UP (ref 3.3–5.2)
ALP SERPL-CCNC: 54 U/L — SIGNIFICANT CHANGE UP (ref 40–120)
ALT FLD-CCNC: 20 U/L — SIGNIFICANT CHANGE UP
ANION GAP SERPL CALC-SCNC: 13 MMOL/L — SIGNIFICANT CHANGE UP (ref 5–17)
APTT BLD: 29.5 SEC — SIGNIFICANT CHANGE UP (ref 27.5–35.5)
AST SERPL-CCNC: 23 U/L — SIGNIFICANT CHANGE UP
BASOPHILS # BLD AUTO: 0.05 K/UL — SIGNIFICANT CHANGE UP (ref 0–0.2)
BASOPHILS NFR BLD AUTO: 0.6 % — SIGNIFICANT CHANGE UP (ref 0–2)
BILIRUB SERPL-MCNC: 0.6 MG/DL — SIGNIFICANT CHANGE UP (ref 0.4–2)
BUN SERPL-MCNC: 19.5 MG/DL — SIGNIFICANT CHANGE UP (ref 8–20)
CALCIUM SERPL-MCNC: 9.8 MG/DL — SIGNIFICANT CHANGE UP (ref 8.6–10.2)
CHLORIDE SERPL-SCNC: 105 MMOL/L — SIGNIFICANT CHANGE UP (ref 98–107)
CO2 SERPL-SCNC: 22 MMOL/L — SIGNIFICANT CHANGE UP (ref 22–29)
CREAT SERPL-MCNC: 0.73 MG/DL — SIGNIFICANT CHANGE UP (ref 0.5–1.3)
EGFR: 79 ML/MIN/1.73M2 — SIGNIFICANT CHANGE UP
EOSINOPHIL # BLD AUTO: 0.24 K/UL — SIGNIFICANT CHANGE UP (ref 0–0.5)
EOSINOPHIL NFR BLD AUTO: 2.7 % — SIGNIFICANT CHANGE UP (ref 0–6)
GLUCOSE SERPL-MCNC: 93 MG/DL — SIGNIFICANT CHANGE UP (ref 70–99)
HCT VFR BLD CALC: 41.7 % — SIGNIFICANT CHANGE UP (ref 34.5–45)
HGB BLD-MCNC: 13.7 G/DL — SIGNIFICANT CHANGE UP (ref 11.5–15.5)
IMM GRANULOCYTES NFR BLD AUTO: 0.2 % — SIGNIFICANT CHANGE UP (ref 0–1.5)
INR BLD: 1.49 RATIO — HIGH (ref 0.88–1.16)
LYMPHOCYTES # BLD AUTO: 2.66 K/UL — SIGNIFICANT CHANGE UP (ref 1–3.3)
LYMPHOCYTES # BLD AUTO: 29.9 % — SIGNIFICANT CHANGE UP (ref 13–44)
MCHC RBC-ENTMCNC: 32.8 PG — SIGNIFICANT CHANGE UP (ref 27–34)
MCHC RBC-ENTMCNC: 32.9 GM/DL — SIGNIFICANT CHANGE UP (ref 32–36)
MCV RBC AUTO: 99.8 FL — SIGNIFICANT CHANGE UP (ref 80–100)
MONOCYTES # BLD AUTO: 0.95 K/UL — HIGH (ref 0–0.9)
MONOCYTES NFR BLD AUTO: 10.7 % — SIGNIFICANT CHANGE UP (ref 2–14)
NEUTROPHILS # BLD AUTO: 4.98 K/UL — SIGNIFICANT CHANGE UP (ref 1.8–7.4)
NEUTROPHILS NFR BLD AUTO: 55.9 % — SIGNIFICANT CHANGE UP (ref 43–77)
PLATELET # BLD AUTO: 222 K/UL — SIGNIFICANT CHANGE UP (ref 150–400)
POTASSIUM SERPL-MCNC: 4.4 MMOL/L — SIGNIFICANT CHANGE UP (ref 3.5–5.3)
POTASSIUM SERPL-SCNC: 4.4 MMOL/L — SIGNIFICANT CHANGE UP (ref 3.5–5.3)
PROT SERPL-MCNC: 7.4 G/DL — SIGNIFICANT CHANGE UP (ref 6.6–8.7)
PROTHROM AB SERPL-ACNC: 17.3 SEC — HIGH (ref 10.5–13.4)
RBC # BLD: 4.18 M/UL — SIGNIFICANT CHANGE UP (ref 3.8–5.2)
RBC # FLD: 13.2 % — SIGNIFICANT CHANGE UP (ref 10.3–14.5)
SODIUM SERPL-SCNC: 140 MMOL/L — SIGNIFICANT CHANGE UP (ref 135–145)
WBC # BLD: 8.9 K/UL — SIGNIFICANT CHANGE UP (ref 3.8–10.5)
WBC # FLD AUTO: 8.9 K/UL — SIGNIFICANT CHANGE UP (ref 3.8–10.5)

## 2022-04-04 PROCEDURE — 93312 ECHO TRANSESOPHAGEAL: CPT

## 2022-04-04 PROCEDURE — 93325 DOPPLER ECHO COLOR FLOW MAPG: CPT

## 2022-04-04 PROCEDURE — 93005 ELECTROCARDIOGRAM TRACING: CPT

## 2022-04-04 PROCEDURE — 36415 COLL VENOUS BLD VENIPUNCTURE: CPT

## 2022-04-04 PROCEDURE — 93320 DOPPLER ECHO COMPLETE: CPT | Mod: 26

## 2022-04-04 PROCEDURE — 93312 ECHO TRANSESOPHAGEAL: CPT | Mod: 26

## 2022-04-04 PROCEDURE — 85610 PROTHROMBIN TIME: CPT

## 2022-04-04 PROCEDURE — 93010 ELECTROCARDIOGRAM REPORT: CPT

## 2022-04-04 PROCEDURE — 93320 DOPPLER ECHO COMPLETE: CPT

## 2022-04-04 PROCEDURE — 85730 THROMBOPLASTIN TIME PARTIAL: CPT

## 2022-04-04 PROCEDURE — 85025 COMPLETE CBC W/AUTO DIFF WBC: CPT

## 2022-04-04 PROCEDURE — 80053 COMPREHEN METABOLIC PANEL: CPT

## 2022-04-04 PROCEDURE — 93325 DOPPLER ECHO COLOR FLOW MAPG: CPT | Mod: 26

## 2022-04-04 RX ORDER — METOPROLOL TARTRATE 50 MG
1 TABLET ORAL
Qty: 0 | Refills: 0 | DISCHARGE

## 2022-04-04 RX ORDER — ATORVASTATIN CALCIUM 80 MG/1
1 TABLET, FILM COATED ORAL
Qty: 0 | Refills: 0 | DISCHARGE

## 2022-04-04 RX ORDER — ERGOCALCIFEROL 1.25 MG/1
1 CAPSULE ORAL
Qty: 0 | Refills: 0 | DISCHARGE

## 2022-04-04 NOTE — DISCHARGE NOTE NURSING/CASE MANAGEMENT/SOCIAL WORK - PATIENT PORTAL LINK FT
You can access the FollowMyHealth Patient Portal offered by [facility] by registering at the following website: [email or web address]. By joining [employer]’s FollowMyHealth portal, you will also be able to view your health information using other applications (apps) compatible with our system.

## 2022-04-04 NOTE — DISCHARGE NOTE PROVIDER - NSDCMRMEDTOKEN_GEN_ALL_CORE_FT
Aspir 81 oral delayed release tablet: 1  orally 2 times a day  Entresto 49 mg-51 mg oral tablet: 1 tab(s) orally 2 times a day  glipiZIDE 2.5 mg oral tablet, extended release: 1 tab(s) orally once a day  metoprolol tartrate 50 mg oral tablet: 0.5 tab(s) orally 2 times a day  pravastatin 20 mg oral tablet: 1 tab(s) orally once a day  spironolactone 25 mg oral tablet: orally once a day  Xarelto 20 mg oral tablet: 1 tab(s) orally once a day (in the evening)  ZyrTEC 10 mg oral tablet: 1 tab(s) orally once a day

## 2022-04-04 NOTE — DISCHARGE NOTE PROVIDER - HOSPITAL COURSE
Narrative:  89 y/o F with PMH HTN, HLD, DM, Afib on Xarelto, CAD S/P MI PCI to LAD 2018, HFrEF EF 25% and low gradient aortic stenosis who presents for YOLANDA for further evaluation and possibility of TAVR.    Patient now sp YOLANDA revealing moderate AS.

## 2022-04-04 NOTE — DISCHARGE NOTE PROVIDER - CARE PROVIDER_API CALL
Maged Humphreys)  Cardiovascular Disease; Internal Medicine; Interventional Cardiology  [address] [place] [address]  Phone: [phone]  Fax: [phone]  Follow Up Time: 1 month

## 2022-04-04 NOTE — DISCHARGE NOTE PROVIDER - NSDCFUSCHEDAPPT_GEN_ALL_CORE_FT
EDWARDO ORTIZ ; 04/12/2022 ; NPP CT Surg [address]  EDWARDO ORTIZ ; 05/03/2022 ; [facility] Cardio [facility]

## 2022-04-04 NOTE — DISCHARGE NOTE PROVIDER - CARE PROVIDERS DIRECT ADDRESSES
,[email or web address]

## 2022-04-04 NOTE — DISCHARGE NOTE PROVIDER - NSDCCPCAREPLAN_GEN_ALL_CORE_FT
PRINCIPAL DISCHARGE DIAGNOSIS  Diagnosis: AS (aortic stenosis)  Assessment and Plan of Treatment:

## 2022-04-12 ENCOUNTER — APPOINTMENT (OUTPATIENT)
Dept: CARDIOTHORACIC SURGERY | Facility: CLINIC | Age: 87
End: 2022-04-12
Payer: MEDICARE

## 2022-04-12 VITALS
HEIGHT: 60 IN | WEIGHT: 160 LBS | RESPIRATION RATE: 19 BRPM | BODY MASS INDEX: 31.41 KG/M2 | SYSTOLIC BLOOD PRESSURE: 128 MMHG | OXYGEN SATURATION: 96 % | DIASTOLIC BLOOD PRESSURE: 64 MMHG | HEART RATE: 62 BPM

## 2022-04-12 PROCEDURE — 99213 OFFICE O/P EST LOW 20 MIN: CPT

## 2022-04-15 NOTE — CONSULT LETTER
[Dear  ___] : Dear  [unfilled], [Courtesy Letter:] : I had the pleasure of seeing your patient, [unfilled], in my office today. [Please see my note below.] : Please see my note below. [Consult Closing:] : Thank you very much for allowing me to participate in the care of this patient.  If you have any questions, please do not hesitate to contact me. [Sincerely,] : Sincerely, [FreeTextEntry3] : Kemal Ribera MD\par  of Cardiothoracic Surgery\par [facility]\par [address] \[place] [address]\par [phone]\par  [FreeTextEntry2] : Maged Humphreys MD

## 2022-04-15 NOTE — ASSESSMENT
[FreeTextEntry1] : Ms Scott reports to the office today states that she is feeling better since starting the Entresto.  She does remain short of breath but reports feeling more energy. After review of the imaging on echocargiogram her valve dysfunction remains reported as severe in nature.  \par \par She will follow up with Dr. Humphreys on 5/3/22. He will assess her clinically and decide if he wants to proceed with a Cardiac Catherization. I would like to evaluate her again in 6 months with repeat transthoracic echocardiogram imaging. She agrees and will follow up accordingly.\par \par PLAN:\par - Follow up Cardiology (Dr Humphreys)\par - Transthoracic Echocardiogram in 6 months\par - Return to care after imaging\par \par \par \par \par \par \par I, Ismael Abrams NP am scribing for and in the presence of Dr. Ribera the following sections HISTORY OF PRESENT ILLNESS, PAST MEDICAL/FAMILY/SOCIAL HISTORY; REVIEW OF SYSTEMS; VITAL SIGNS; PHYSICAL EXAM; DISPOSITION.\par \par "I personally performed the services described in the documentation, reviewed the documentation recorded by the scribe in my presence and accurately and completely records my words and actions."\par \par \par \par \par

## 2022-04-15 NOTE — REVIEW OF SYSTEMS
[Feeling Tired] : feeling tired [Shortness Of Breath] : shortness of breath [SOB on Exertion] : shortness of breath during exertion [Joint Swelling] : joint swelling [Joint Stiffness] : joint stiffness [Limb Pain] : limb pain [Easy Bleeding] : a tendency for easy bleeding [Easy Bruising] : a tendency for easy bruising [Negative] : Heme/Lymph [FreeTextEntry9] : BL knee pain

## 2022-04-15 NOTE — PHYSICAL EXAM
[Sclera] : the sclera and conjunctiva were normal [Neck Appearance] : the appearance of the neck was normal [Respiration, Rhythm And Depth] : normal respiratory rhythm and effort [Auscultation Breath Sounds / Voice Sounds] : lungs were clear to auscultation bilaterally [Heart Rate And Rhythm] : heart rate was normal and rhythm regular [FreeTextEntry1] : NYHAC III  [Examination Of The Chest] : the chest was normal in appearance [Motor Tone] : muscle strength and tone were normal [Skin Color & Pigmentation] : normal skin color and pigmentation [Sensation] : the sensory exam was normal to light touch and pinprick [Motor Exam] : the motor exam was normal [Oriented To Time, Place, And Person] : oriented to person, place, and time [Impaired Insight] : insight and judgment were intact

## 2022-04-15 NOTE — HISTORY OF PRESENT ILLNESS
[FreeTextEntry1] : Ms. ORTIZ is a 88 year old female referred by Dr. Humphreys who presents for consultation. Her past medical history includes recurrent falls, HTN, HLD, Diabetes, MI with PCI to LAD , HFrEF (EF 25% on Entresto), atrial tachycardia (Xarelto), and low gradient aortic stenosis. \par \par She has been followed by Cardiology for her increasing shortness of breath with heart failure. She has a history of MI and PCI with a noted  in EF over the past year. She was ordered for a dobutamine stress echo to assess for low flow aortic stenosis given her worsening in symptoms. There is moderate aortic stenosis on stress echo. She reports to the office today to discuss the possibility of TAVR. \par \par Fatigues easily, becomes short of breath on exertion. She lives alone in a condo and has little help available She ambulates with a cane. \par \par She is here today and the slightest movements she is required to work harder because she has bad knees, and then she becomes short of breath.

## 2022-04-15 NOTE — DATA REVIEWED
[FreeTextEntry1] : Transesophageal Echocardiogram from 4/6/22 at [facility] \par - LVEF 35-40%\par - severely enlarged left atrium\par - Moderate tricuspid valve regurgitation\par - PAVG 30.8 mmHg, MAVG 15.8 mmHg. DANIELLE 0.63 cm2 severe aortic stenosis\par - Low flow low gradient aortic stenosis

## 2022-05-03 ENCOUNTER — APPOINTMENT (OUTPATIENT)
Dept: CARDIOLOGY | Facility: CLINIC | Age: 87
End: 2022-05-03

## 2022-08-31 ENCOUNTER — NON-APPOINTMENT (OUTPATIENT)
Age: 87
End: 2022-08-31

## 2022-08-31 ENCOUNTER — APPOINTMENT (OUTPATIENT)
Dept: CARDIOLOGY | Facility: CLINIC | Age: 87
End: 2022-08-31

## 2022-08-31 VITALS
HEIGHT: 60 IN | HEART RATE: 69 BPM | OXYGEN SATURATION: 95 % | BODY MASS INDEX: 30.63 KG/M2 | TEMPERATURE: 97.8 F | WEIGHT: 156 LBS | SYSTOLIC BLOOD PRESSURE: 125 MMHG | DIASTOLIC BLOOD PRESSURE: 76 MMHG

## 2022-08-31 PROCEDURE — 99214 OFFICE O/P EST MOD 30 MIN: CPT

## 2022-08-31 PROCEDURE — 93000 ELECTROCARDIOGRAM COMPLETE: CPT

## 2022-08-31 RX ORDER — ASPIRIN 81 MG/1
81 TABLET, DELAYED RELEASE ORAL DAILY
Qty: 30 | Refills: 0 | Status: DISCONTINUED | COMMUNITY
Start: 2018-05-09 | End: 2022-08-31

## 2022-08-31 RX ORDER — PRAVASTATIN SODIUM 20 MG/1
20 TABLET ORAL
Qty: 90 | Refills: 1 | Status: DISCONTINUED | COMMUNITY
Start: 2021-10-13 | End: 2022-08-31

## 2022-08-31 NOTE — ASSESSMENT
[FreeTextEntry1] : ECG performed today at the clinic revealed  NSR 60 BPM occasional APBs. RBBB and LAHB with Q in V1-3. No change.

## 2022-08-31 NOTE — PHYSICAL EXAM
[Well Developed] : well developed [Well Nourished] : well nourished [No Acute Distress] : no acute distress [Normal Conjunctiva] : normal conjunctiva [Normal Venous Pressure] : normal venous pressure [No Carotid Bruit] : no carotid bruit [Normal S1, S2] : normal S1, S2 [No Rub] : no rub [No Gallop] : no gallop [Clear Lung Fields] : clear lung fields [Good Air Entry] : good air entry [No Respiratory Distress] : no respiratory distress  [Soft] : abdomen soft [Non Tender] : non-tender [No Masses/organomegaly] : no masses/organomegaly [Normal Bowel Sounds] : normal bowel sounds [Normal Gait] : normal gait [No Edema] : no edema [No Cyanosis] : no cyanosis [No Clubbing] : no clubbing [No Varicosities] : no varicosities [No Rash] : no rash [No Skin Lesions] : no skin lesions [Moves all extremities] : moves all extremities [No Focal Deficits] : no focal deficits [Normal Speech] : normal speech [Alert and Oriented] : alert and oriented [Normal memory] : normal memory [de-identified] : OPAL 4/6 at LSB with absent S2. Radiates to the left carotid.

## 2022-08-31 NOTE — HISTORY OF PRESENT ILLNESS
[FreeTextEntry1] : 89 yo woman,  from [place] that passed in Dec 2020 from lung cancer and COVID 2019. She has known HTN, DM2 and hyperlipidemia all under medical management. Has known CAD since 12/2015 when she was admitted at [facility] with a myocardial infarction and underwent urgent coronary angiography and PCI to the LAD. Preserved LV function. Since then under medical follow up.\par Last echocardiogram was done in 6/2018 revealed moderate LV dysfunction with EF= 40-45% and moderate AS 1.16 cm2. \par On 7/12/2019 she had a Rt and Lt heart cath that revealed mild irregularities in the coronary arteries, patent stent in the LAD, reduced EF (30%), and mild aortic stenosis with a gradient of 9 mmHg and DANIELLE=1.8 cm2. Right heart pressures were mildly elevated (PAP=27 mmHg). \par Had BE cataract surgery with no complications. \par Had COVID in Dec 2020. Still has not had the vaccine. \par On 3/2021 she had recurrent falls at home, was seen at a PCP and had chest ray and states that she has two broken ribs.\par On 3/30/2021 she was seen at the office with severe SOB. Was referred to the hospital where she was found to have atrial tachycardia. Treated with amiodarone and and metoprolol. Echo done 4/1/2021 revealed severely reduced LV function 25%.\par YOLANDA done in 4/4/2022 revealed a AVG=30.8 mmHg and DANIELLE=0.63 cm2 with LVEF=35-40%\par On 5/8/2022 she had a mechanical fall and had a Rt femur fracture that needed surgical reduction. Since then she has recovered very well under rehab and uses a walker now. \par She was assessed by Dr Ribera in 4/2022 and he thought that the valve was stenotic and need a TAVR. However, patient is still reluctant. Her "psychic" told her that she doesn’t need a new valve.  \par Today at the office for follow up, she C/O increasing SOB. NYHA 3/4 but she is limited by her recent fall and the use of walker. No CP, orthopnea or PND. Denies palpitations, dizziness or ankle swelling. Nocturia. \par Had COVID 19 in 1/2021. Has not received the vaccine. \par Denies smoking, has social drinking. Denies the use of illicit drugs.\par Was prescribed oxycodone for knee pain but her PCP. Discussed risks and benefits.\par \par

## 2022-08-31 NOTE — DISCUSSION/SUMMARY
[FreeTextEntry1] : 89 yo woman with known CAD and severe aortic stenosis. Cardiac cath in 7/2019 revealed mild irreg of the coronary arteries and DANIELLE=1.8 cm2, reduced EF (30-35%) and mild Pul HTN. \par YOLANDA done in 4/2022 revealed severe AS with an DANIELLE=0.6 cm2 and reduced LVEF=35-40%.\par She was assessed by CT surgery Dr Ribera. See note attached.\par At this time patient refuses TAVR in view of her recent fall and Rt femur fracture. She just finished rehab and still uses a walker. She is symptomatic. We discussed the options. Will reassess in 6 months. \par Will continue same meds\par Follow up in 6 months

## 2022-10-11 ENCOUNTER — OUTPATIENT (OUTPATIENT)
Dept: OUTPATIENT SERVICES | Facility: HOSPITAL | Age: 87
LOS: 1 days | End: 2022-10-11
Payer: MEDICARE

## 2022-10-11 ENCOUNTER — APPOINTMENT (OUTPATIENT)
Dept: CARDIOTHORACIC SURGERY | Facility: CLINIC | Age: 87
End: 2022-10-11

## 2022-10-11 VITALS
WEIGHT: 156 LBS | OXYGEN SATURATION: 95 % | HEART RATE: 75 BPM | SYSTOLIC BLOOD PRESSURE: 112 MMHG | BODY MASS INDEX: 30.63 KG/M2 | DIASTOLIC BLOOD PRESSURE: 72 MMHG | HEIGHT: 60 IN | RESPIRATION RATE: 16 BRPM

## 2022-10-11 DIAGNOSIS — I35.0 NONRHEUMATIC AORTIC (VALVE) STENOSIS: ICD-10-CM

## 2022-10-11 DIAGNOSIS — Z98.61 CORONARY ANGIOPLASTY STATUS: Chronic | ICD-10-CM

## 2022-10-11 PROCEDURE — 93306 TTE W/DOPPLER COMPLETE: CPT | Mod: 26

## 2022-10-11 PROCEDURE — 99214 OFFICE O/P EST MOD 30 MIN: CPT

## 2022-10-11 PROCEDURE — 93306 TTE W/DOPPLER COMPLETE: CPT

## 2022-10-13 NOTE — ASSESSMENT
[FreeTextEntry1] : Ms Sofia was just discharged from rehabilitation after having a hip fracture and orthopedic surgery. She had an echocardiogram today official results pending. She has shortness of breath in the morning and then becomes fatigued. The aortic valve appears moderately stenosed and stress testing was performed that was not significant. \par \par Because she is short of breath and has some symptoms she may be a candidate for TAVR valve placement. \par \par She has symptoms however she is not willing to proceed at this time with TAVR. If the echocardiogram reveals valvular stenosis she still does not want to have surgery. She wants to "wait until next year to proceed when she is no longer walking with a cane". We reviewed the procedure and the risk and benefit including risk of death if she does not have valvular intervention and she does not want to proceed. She will think about having intervention later after the holidays. \par \par PLAN:\par - Continue care with Cardiology \par - Return to care with surgical decision \par \par \par \par \par \par \par \par IIsmael NP am scribing for and in the presence of Dr. Ribera the following sections HISTORY OF PRESENT ILLNESS, PAST MEDICAL/FAMILY/SOCIAL HISTORY; REVIEW OF SYSTEMS; VITAL SIGNS; PHYSICAL EXAM; DISPOSITION.\par \par "I personally performed the services described in the documentation, reviewed the documentation recorded by the scribe in my presence and accurately and completely records my words and actions."\par

## 2022-10-13 NOTE — HISTORY OF PRESENT ILLNESS
[FreeTextEntry1] : Ms. ORTIZ is a 88 year old female referred by Dr. Humphreys who presents for consultation. Her past medical history includes recurrent falls, HTN, HLD, Diabetes, MI with PCI to 2018, HFrEF (EF 25% on Entresto), atrial tachycardia (Xarelto), and low gradient aortic stenosis. \par \par She has been followed by Cardiology for her increasing shortness of breath with heart failure. She has a history of MI and PCI with a noted  in EF over the past year. She was ordered for a dobutamine stress echo to assess for low flow aortic stenosis given her worsening in symptoms. \par \par She had initially been evaluated in 2022 for shortness of breath and aortic stenosis for TAVR valve. SHe ultimately refused intervention and testing as she had consulted a "psychic" who told her she does not need a new valve. She then sustained a fall in May resulting in a femur fracture and required surgical reduction. She now ambulates with a walker.\par \par She reports to the office today with complaints of shortness of breath. She states that she has not been taking a low salt diet and uses salt frequently in her meals. She had just come back from the diner from having a cheeseburger and fries. \par \par

## 2022-10-13 NOTE — REVIEW OF SYSTEMS
[Feeling Tired] : feeling tired [Shortness Of Breath] : shortness of breath [SOB on Exertion] : shortness of breath during exertion [Negative] : Heme/Lymph [Feeling Poorly] : not feeling poorly [Chest Pain] : no chest pain [Palpitations] : no palpitations [Cough] : no cough

## 2022-10-13 NOTE — PHYSICAL EXAM
[Sclera] : the sclera and conjunctiva were normal [Respiration, Rhythm And Depth] : normal respiratory rhythm and effort [Neck Appearance] : the appearance of the neck was normal [Heart Rate And Rhythm] : heart rate was normal and rhythm regular [Examination Of The Chest] : the chest was normal in appearance [2+] : left 2+ [Abnormal Walk] : normal gait [Skin Color & Pigmentation] : normal skin color and pigmentation [Sensation] : the sensory exam was normal to light touch and pinprick [Motor Exam] : the motor exam was normal [Oriented To Time, Place, And Person] : oriented to person, place, and time [Impaired Insight] : insight and judgment were intact [FreeTextEntry1] : NYHAC II   Grade 3/6 systolic murmur

## 2022-10-13 NOTE — CONSULT LETTER
[Dear  ___] : Dear  [unfilled], [Courtesy Letter:] : I had the pleasure of seeing your patient, [unfilled], in my office today. [Please see my note below.] : Please see my note below. [Consult Closing:] : Thank you very much for allowing me to participate in the care of this patient.  If you have any questions, please do not hesitate to contact me. [Sincerely,] : Sincerely, [FreeTextEntry2] : Maged Humphreys MD [FreeTextEntry3] : Kemal Ribera MD\par  of Cardiothoracic Surgery\par [facility]\par [address] \[place] [address]\par [phone]\par

## 2022-11-21 NOTE — ASU PATIENT PROFILE, ADULT - AS SC BRADEN SENSORY
Pita Valdes is requesting a refill of Iron, Ferrous Sulfate, 325 (65 Fe) MG Tab for a 30 day supply and would like   [id number] [address] & [address] [place] [id number]  Phone: [phone] Fax: [phone]  .     Ok to leave a detailed message on voicemail Yes     (4) no impairment

## 2023-02-08 ENCOUNTER — NON-APPOINTMENT (OUTPATIENT)
Age: 88
End: 2023-02-08

## 2023-02-22 ENCOUNTER — APPOINTMENT (OUTPATIENT)
Dept: CARDIOLOGY | Facility: CLINIC | Age: 88
End: 2023-02-22
Payer: MEDICARE

## 2023-02-22 ENCOUNTER — NON-APPOINTMENT (OUTPATIENT)
Age: 88
End: 2023-02-22

## 2023-02-22 VITALS
HEIGHT: 60 IN | SYSTOLIC BLOOD PRESSURE: 118 MMHG | OXYGEN SATURATION: 98 % | BODY MASS INDEX: 34.36 KG/M2 | HEART RATE: 65 BPM | DIASTOLIC BLOOD PRESSURE: 78 MMHG | TEMPERATURE: 98 F | WEIGHT: 175 LBS

## 2023-02-22 PROBLEM — I35.0 SEVERE AORTIC STENOSIS: Status: ACTIVE | Noted: 2023-02-22

## 2023-02-22 PROCEDURE — 93000 ELECTROCARDIOGRAM COMPLETE: CPT

## 2023-02-22 PROCEDURE — 99215 OFFICE O/P EST HI 40 MIN: CPT

## 2023-02-22 RX ORDER — CETIRIZINE HYDROCHLORIDE 10 MG/1
10 CAPSULE, LIQUID FILLED ORAL
Refills: 0 | Status: DISCONTINUED | COMMUNITY
End: 2023-02-22

## 2023-02-28 ENCOUNTER — APPOINTMENT (OUTPATIENT)
Dept: CARDIOTHORACIC SURGERY | Facility: CLINIC | Age: 88
End: 2023-02-28
Payer: MEDICARE

## 2023-02-28 DIAGNOSIS — I35.0 NONRHEUMATIC AORTIC (VALVE) STENOSIS: ICD-10-CM

## 2023-03-07 ENCOUNTER — APPOINTMENT (OUTPATIENT)
Dept: CARDIOTHORACIC SURGERY | Facility: CLINIC | Age: 88
End: 2023-03-07
Payer: MEDICARE

## 2023-03-07 VITALS
DIASTOLIC BLOOD PRESSURE: 85 MMHG | HEART RATE: 66 BPM | RESPIRATION RATE: 16 BRPM | OXYGEN SATURATION: 97 % | SYSTOLIC BLOOD PRESSURE: 136 MMHG

## 2023-03-07 PROCEDURE — 99213 OFFICE O/P EST LOW 20 MIN: CPT

## 2023-03-08 NOTE — HISTORY OF PRESENT ILLNESS
[FreeTextEntry1] : \par Ms. ORTIZ is a 88 year old female referred by Dr. Humphreys who presents for consultation. Her past medical history includes recurrent falls, HTN, HLD, Diabetes, MI with PCI to LAD , HFrEF (EF 25% on Entresto), atrial tachycardia (Xarelto), and low gradient aortic stenosis. \par \par She has been followed by Cardiology for her increasing shortness of breath with heart failure. She has a history of MI and PCI with a noted  in EF over the past year. She was ordered for a dobutamine stress echo to assess for low flow aortic stenosis given her worsening in symptoms. \par \par She had initially been evaluated in 2022 for shortness of breath and aortic stenosis for TAVR valve. SHe ultimately refused intervention and testing as she had consulted a "psychic" who told her she does not need a new valve. She then sustained a fall in May resulting in a femur fracture and required surgical reduction. She now ambulates with a walker.\par \par From our last visit on 10/11/22:    Ms Ortiz was just discharged from rehabilitation after having a hip fracture and orthopedic surgery. She had an echocardiogram today official results pending. She has shortness of breath in the morning and then becomes fatigued. The aortic valve appears moderately stenosed and stress testing was performed that was not significant. \par \par \par She presents to the office today to discuss TAVR candidacy. She is still hesitant to have any intervention.

## 2023-03-08 NOTE — ASSESSMENT
[FreeTextEntry1] : Ms Sofia presents back to the office again after deciding that she would like to proceed with TAVR testing. earlier testing revealed severe aortic stenosis. She states she is now ready to proceed as she has become more fatigued and short of breath.\par \par PLAN:\par - Transthoracic Echocardiogram \par - CTA of the chest abdomen and pelvis (TAVR protocol) \par - Cardiac Catheterization \par - TAVR (if candidate)\par \par \par \par \par \par \par \par I, Dr. Ribera, personally performed the evaluation and management (E/M) services for this new patient.  That E/M includes conducting the initial examination, assessing all conditions, and establishing the plan of care.  Today, my ACP, Ismael Abrams NP was here to observe my evaluation and management services for this patient to be followed going forward.\par \par \par

## 2023-03-08 NOTE — PHYSICAL EXAM
[Sclera] : the sclera and conjunctiva were normal [Neck Appearance] : the appearance of the neck was normal [Respiration, Rhythm And Depth] : normal respiratory rhythm and effort [Heart Rate And Rhythm] : heart rate was normal and rhythm regular [Abnormal Walk] : normal gait [FreeTextEntry1] : ambulates with 2 canes [Skin Color & Pigmentation] : normal skin color and pigmentation [Sensation] : the sensory exam was normal to light touch and pinprick [Motor Exam] : the motor exam was normal [Oriented To Time, Place, And Person] : oriented to person, place, and time [Impaired Insight] : insight and judgment were intact

## 2023-03-08 NOTE — CONSULT LETTER
[FreeTextEntry2] : Maged Humphreys MD [FreeTextEntry3] : Kemal Ribera MD\par  of Cardiothoracic Surgery\par [facility]\par [address] \[place] [address]\par [phone]\par

## 2023-03-08 NOTE — REVIEW OF SYSTEMS
[Feeling Poorly] : feeling poorly [Feeling Tired] : feeling tired [Chest Pain] : no chest pain [Palpitations] : no palpitations [Lower Ext Edema] : no lower extremity edema [Shortness Of Breath] : shortness of breath [SOB on Exertion] : shortness of breath during exertion [Negative] : Heme/Lymph

## 2023-03-08 NOTE — DATA REVIEWED
[FreeTextEntry1] : Transthoracic echocardiogram from 10/11/22 at [facility] \par - LVEF 30-35%\par - low flow low gradient low EF severe aortic stenosis\par - PAVG 27.5 mmha, MAVG 15.5 mmHg   DANIELLE 0.83 cm2\par \par \par \par Transesophageal Echocardiogram from 4/6/22 at [facility] \par - LVEF 35-40%\par - severely enlarged left atrium\par - Moderate tricuspid valve regurgitation\par - PAVG 30.8 mmHg, MAVG 15.8 mmHg. DANIELLE 0.63 cm2 severe aortic stenosis\par - Low flow low gradient aortic stenosis.

## 2023-03-22 ENCOUNTER — FORM ENCOUNTER (OUTPATIENT)
Age: 88
End: 2023-03-22

## 2023-03-22 RX ORDER — CHLORHEXIDINE GLUCONATE 213 G/1000ML
1 SOLUTION TOPICAL ONCE
Refills: 0 | Status: DISCONTINUED | OUTPATIENT
Start: 2023-03-23 | End: 2023-04-06

## 2023-03-22 NOTE — H&P PST ADULT - HISTORY OF PRESENT ILLNESS
Department of Cardiology                                                                  [facility]/[place] [address] [id number]                                                            Telephone: [phone]. Fax:[phone]                                                                             Pre- Procedure Progress Note      HPI:  88yo female with h/o HTN, HLD, DM, CAD, MI, PCI to LAD 2018, HFrEF with LVEF 30-35%, atrial tachycardia ?AF on xarelto, AS, previously evaluated 4/2022 for possible TAVR to treat AS but refused intervention at that time, followed by Dr Humphreys and endorsing increasing SOB and wants to pursue TAVR w/u, previous DSE 2/2022 with mod AS resting and peak dose dobutamine, YOLANDA 4/2022 with severe LF/LG AS with DANIELLE 0.63cm2, P/MG 30/16mmHg, DI 0.22, repeat TTE 10/2022 with DANIELLE 0.8cm2, P/MG 27/15mmHg, DI 0.25, LVEF 30-35%, now presents for [facility] to R/O CAD as part of TAVR w/u.        Symptoms:        Angina (Class): III       Ischemic Symptoms: HAQUE angina equivalent    Heart Failure:        Systolic/Diastolic/Combined: HFrEF       NYHA Class (within 2 weeks): III    Assessment of LVEF:       EF: 30-35%       Assessed by: TTE       Date: 10/2022    Echo 10/11/2022:     YOLANDA 4/4/23:    Prior Cardiac Interventions:         Stress Test: Date:        Protocol:        Duration of Exercise:        Symptoms:        EKG Changes:        DTS:        Myocardial Imaging:        Risk Assessment (Low, Medium, High):     Associated Risk Factors:        Frailty Assessment: (none/mild/mod/severe)       Cerebrovascular Disease: N/A       Chronic Lung Disease: N/A       Peripheral Arterial Disease: N/A       Chronic Kidney Disease (if yes, what is GFR): N/A       Uncontrolled Diabetes (if yes, what is HgbA1C or FBS): N/A       Poorly Controlled Hypertension (if yes, what is SBP): N/A       Morbid Obesity (if yes, what is BMI): N/A       History of Recent Ventricular Arrhythmia: N/A       Inability to Ambulate Safely: N/A       Need for Therapeutic Anticoagulation: N/A       Antiplatelet or Contrast Allergy: N/A    Antianginal Therapies:        Beta Blockers:         Calcium Channel Blockers:        Long Acting Nitrates:        Ranexa:     	  MEDICATIONS:                    ROS: as stated above, otherwise negative    PHYSICAL EXAM:  Constitutional: A & O x 3  HEENT:   Normal oral mucosa, PERRL, EOMI	  Cardiovascular: Normal S1 S2, No JVD, No murmurs  Respiratory: Lungs clear to auscultation	  Gastrointestinal:  Soft, Non-tender, + BS	  Skin: No rashes, No ecchymoses, No cyanosis  Neurologic: Non-focal  Extremities: Normal range of motion, no edema  Vascular: Peripheral pulses palpable + bilaterally      T(C): --  HR: --  BP: --  RR: --  SpO2: --  Wt(kg): --      I&O's Summary      Daily     Daily     TELEMETRY: 	      ECG:  	    LABS:	 	                        Tnl:    Lipid Profile:   TC  TG  LDL  HDL    HgA1c:     proBNP:       Indication:     Risk Stratification:  ASA:  Mallampati:  Bleeding Risk:  Creatinine:  GFR:    Coronary anatomy:    Plan/Recommendations:   -plan for ****  -preferred access: RRA ***  -patient seen and examined  -confirmed appropriate NPO duration  -ECG and Labs reviewed  -Aspirin 81mg po pre-cath***  -NS 250mL IV bolus pre-cath***  -procedure discussed with patient; risks and benefits explained, questions answered  -consent obtained by attending IC      Risks, benefits, and alternatives reviewed.  Risks including but not limited to MI, death, stroke, bleeding, infection, vessel injury, hematoma, renal failure, allergic reaction, urgent open heart surgery, restenosis and stent thrombosis were reviewed.  All questions answered.  Patient is agreeable to proceed.                                                                              Department of Cardiology                                                                  [facility]/[place] [address] [facility] [facility] [place]                                                            Telephone: [phone]. Fax:[phone]                                                                             Pre- Procedure Progress Note      HPI:  88yo female with h/o HTN, HLD, DM, CAD, MI, PCI to LAD 2018, HFrEF with LVEF 30-35%, atrial tachycardia ?AF on xarelto, AS, previously evaluated 4/2022 for possible TAVR to treat AS but refused intervention at that time, followed by Dr Humphreys and endorsing increasing SOB and wants to pursue TAVR w/u, previous DSE 2/2022 with mod AS resting and peak dose dobutamine, YOLANDA 4/2022 with severe LF/LG AS with DANIELLE 0.63cm2, P/MG 30/16mmHg, DI 0.22, repeat TTE 10/2022 with DANIELLE 0.8cm2, P/MG 27/15mmHg, DI 0.25, LVEF 30-35%, now presents for [facility] to R/O CAD as part of TAVR w/u.        Symptoms:        Angina (Class): III       Ischemic Symptoms: HAQUE angina equivalent    Heart Failure:        Systolic/Diastolic/Combined: HFrEF       NYHA Class (within 2 weeks): III    Assessment of LVEF:       EF: 30-35%       Assessed by: TTE       Date: 10/2022    Echo 10/11/2022:  PHYSICIAN INTERPRETATION:  Left Ventricle: Endocardial visualization was enhanced with intravenous   echo contrast. The left ventricular internal cavity size is normal.  Global LV systolic function was severely decreased. Left ventricular   ejection fraction, by visual estimation, is 30 to 35%. Spectral Doppler   shows pseudonormal pattern of left ventricular myocardial filling (Grade   II diastolic dysfunction).  Right Ventricle: Normal right ventricular size and function.  Left Atrium: Severely enlarged left atrium.  Right Atrium: Normal right atrial size.  Pericardium: There is no evidence of pericardial effusion.  Mitral Valve: The mitral valve leaflets are tethered which is due to   reduced systolic function and elevated LVDP. There is moderate mitral   annular calcification. Mild mitral valve regurgitation is seen.  Tricuspid Valve: The tricuspid valve is normal in structure. Mild   tricuspid regurgitation is visualized.  Aortic Valve: The aortic valve is trileaflet. Trivial aortic valve   regurgitation is seen. Low-flow, low-gradient low EF severe aortic   stenosis. On 2D appears true aortic stenosis. Cannot rule out   pseudo-aortic stenosis. Recommend Dobutmaine stress echo or CT calcium   score to evaluate for true aortic stenosis if clinical diagnosis in   question. The Dimesionless Index value is .25.  Pulmonic Valve: Mild pulmonic valve regurgitation.  Aorta: Aortic root measured at Sinus of Valsalva is normal. There is mild   aortic root calcification.  Pulmonary Artery: The pulmonary artery is of normal size and origin.      Summary:   1. Technically limited study. Endocardial visualization was enhanced   with intravenous echo contrast.   2. Severely enlarged left atrium.   3. Left ventricular ejection fraction, by visual estimation, is 30 to   35%.   4. Normal right atrial size.   5. Normal right ventricular size and function.   6. Mild mitral valve regurgitation.   7. Low-flow, low-gradient low EF severe aortic stenosis. On 2D appears   true aortic stenosis. Cannot rule out pseudo-aortic stenosis. Recommend   Dobutmaine stress echo or CT calcium score to evaluate for true aortic   stenosis if clinical diagnosis in question.   8. Mild tricuspid regurgitation.   9. There is no evidence of pericardial effusion.  10. There is mild aortic root calcification.     YOLANDA 4/4/23:    Prior Cardiac Interventions:           caAssociated Risk Factors:        Frailty Assessment: (none/mild/mod/severe)       Cerebrovascular Disease: N/A       Chronic Lung Disease: N/A       Peripheral Arterial Disease: N/A       Chronic Kidney Disease (if yes, what is GFR): N/A       Uncontrolled Diabetes (if yes, what is HgbA1C or FBS): N/A       Poorly Controlled Hypertension (if yes, what is SBP): N/A       Morbid Obesity (if yes, what is BMI): N/A       History of Recent Ventricular Arrhythmia: N/A       Inability to Ambulate Safely: N/A       Need for Therapeutic Anticoagulation: N/A       Antiplatelet or Contrast Allergy: N/A    Antianginal Therapies:        Beta Blockers:         Calcium Channel Blockers:        Long Acting Nitrates:        Ranexa:     	  MEDICATIONS:                    ROS: as stated above, otherwise negative    PHYSICAL EXAM:  Constitutional: A & O x 3  HEENT:   Normal oral mucosa, PERRL, EOMI	  Cardiovascular: Normal S1 S2, No JVD, No murmurs  Respiratory: Lungs clear to auscultation	  Gastrointestinal:  Soft, Non-tender, + BS	  Skin: No rashes, No ecchymoses, No cyanosis  Neurologic: Non-focal  Extremities: Normal range of motion, no edema  Vascular: Peripheral pulses palpable + bilaterally      T(C): --  HR: --  BP: --  RR: --  SpO2: --  Wt(kg): --      I&O's Summary      Daily     Daily     TELEMETRY: 	      ECG:  	    LABS:	 	                        Tnl:    Lipid Profile:   TC  TG  LDL  HDL    HgA1c:     proBNP:       Indication:     Risk Stratification:  ASA:  Mallampati:  Bleeding Risk:  Creatinine:  GFR:    Coronary anatomy:    Plan/Recommendations:   -plan for ****  -preferred access: RRA ***  -patient seen and examined  -confirmed appropriate NPO duration  -ECG and Labs reviewed  -Aspirin 81mg po pre-cath***  -NS 250mL IV bolus pre-cath***  -procedure discussed with patient; risks and benefits explained, questions answered  -consent obtained by attending IC      Risks, benefits, and alternatives reviewed.  Risks including but not limited to MI, death, stroke, bleeding, infection, vessel injury, hematoma, renal failure, allergic reaction, urgent open heart surgery, restenosis and stent thrombosis were reviewed.  All questions answered.  Patient is agreeable to proceed.                                                                              Department of Cardiology                                                                  [facility]/[place] [address] [facility] [place]                                                            Telephone: [phone]. Fax:[phone]                                                                             Pre- Procedure Progress Note      HPI:  89yo female with h/o HTN, HLD, DM, CAD, MI, PCI to LAD 2018, HFrEF with LVEF 30-35%, atrial tachycardia ?AF on xarelto, AS, previously evaluated 4/2022 for possible TAVR to treat AS but refused intervention at that time, followed by Dr Humphreys and endorsing increasing SOB and wants to pursue TAVR w/u, previous DSE 2/2022 with mod AS resting and peak dose dobutamine, YOLANDA 4/2022 with severe LF/LG AS with DANIELLE 0.63cm2, P/MG 30/16mmHg, DI 0.22, repeat TTE 10/2022 with DANIELLE 0.8cm2, P/MG 27/15mmHg, DI 0.25, LVEF 30-35%, now presents for [facility] to R/O CAD as part of TAVR w/u.        Symptoms:        Angina (Class): III       Ischemic Symptoms: HAQUE angina equivalent    Heart Failure:        Systolic/Diastolic/Combined: HFrEF       NYHA Class (within 2 weeks): III    Assessment of LVEF:       EF: 30-35%       Assessed by: TTE       Date: 10/2022    Echo 10/11/2022:  PHYSICIAN INTERPRETATION:  Left Ventricle: Endocardial visualization was enhanced with intravenous   echo contrast. The left ventricular internal cavity size is normal.  Global LV systolic function was severely decreased. Left ventricular   ejection fraction, by visual estimation, is 30 to 35%. Spectral Doppler   shows pseudonormal pattern of left ventricular myocardial filling (Grade   II diastolic dysfunction).  Right Ventricle: Normal right ventricular size and function.  Left Atrium: Severely enlarged left atrium.  Right Atrium: Normal right atrial size.  Pericardium: There is no evidence of pericardial effusion.  Mitral Valve: The mitral valve leaflets are tethered which is due to   reduced systolic function and elevated LVDP. There is moderate mitral   annular calcification. Mild mitral valve regurgitation is seen.  Tricuspid Valve: The tricuspid valve is normal in structure. Mild   tricuspid regurgitation is visualized.  Aortic Valve: The aortic valve is trileaflet. Trivial aortic valve   regurgitation is seen. Low-flow, low-gradient low EF severe aortic   stenosis. On 2D appears true aortic stenosis. Cannot rule out   pseudo-aortic stenosis. Recommend Dobutmaine stress echo or CT calcium   score to evaluate for true aortic stenosis if clinical diagnosis in   question. The Dimesionless Index value is .25.  Pulmonic Valve: Mild pulmonic valve regurgitation.  Aorta: Aortic root measured at Sinus of Valsalva is normal. There is mild   aortic root calcification.  Pulmonary Artery: The pulmonary artery is of normal size and origin.      Summary:   1. Technically limited study. Endocardial visualization was enhanced   with intravenous echo contrast.   2. Severely enlarged left atrium.   3. Left ventricular ejection fraction, by visual estimation, is 30 to   35%.   4. Normal right atrial size.   5. Normal right ventricular size and function.   6. Mild mitral valve regurgitation.   7. Low-flow, low-gradient low EF severe aortic stenosis. On 2D appears   true aortic stenosis. Cannot rule out pseudo-aortic stenosis. Recommend   Dobutmaine stress echo or CT calcium score to evaluate for true aortic   stenosis if clinical diagnosis in question.   8. Mild tricuspid regurgitation.   9. There is no evidence of pericardial effusion.  10. There is mild aortic root calcification.     YOLANDA 4/4/23:    Prior Cardiac Interventions:           Associated Risk Factors:        Frailty Assessment: (none/mild/mod/severe)       Cerebrovascular Disease: N/A       Chronic Lung Disease: N/A       Peripheral Arterial Disease: N/A       Chronic Kidney Disease (if yes, what is GFR): N/A       Uncontrolled Diabetes (if yes, what is HgbA1C or FBS): N/A       Poorly Controlled Hypertension (if yes, what is SBP): N/A       Morbid Obesity (if yes, what is BMI): N/A       History of Recent Ventricular Arrhythmia: N/A       Inability to Ambulate Safely: N/A       Need for Therapeutic Anticoagulation: N/A       Antiplatelet or Contrast Allergy: N/A    Antianginal Therapies:        Beta Blockers:         Calcium Channel Blockers:        Long Acting Nitrates:        Ranexa:     	  MEDICATIONS:                    ROS: as stated above, otherwise negative    PHYSICAL EXAM:  Constitutional: A & O x 3  HEENT:   Normal oral mucosa, PERRL, EOMI	  Cardiovascular: Normal S1 S2, No JVD, No murmurs  Respiratory: Lungs clear to auscultation	  Gastrointestinal:  Soft, Non-tender, + BS	  Skin: No rashes, No ecchymoses, No cyanosis  Neurologic: Non-focal  Extremities: Normal range of motion, no edema  Vascular: Peripheral pulses palpable + bilaterally      T(C): --  HR: --  BP: --  RR: --  SpO2: --  Wt(kg): --      I&O's Summary      Daily     Daily     TELEMETRY: 	      ECG:  	    LABS:	 	                        Tnl:    Lipid Profile:   TC  TG  LDL  HDL    HgA1c:     proBNP:       Indication:     Risk Stratification:  ASA:  Mallampati:  Bleeding Risk:  Creatinine:  GFR:    Coronary anatomy:    Plan/Recommendations:   -plan for ****  -preferred access: RRA ***  -patient seen and examined  -confirmed appropriate NPO duration  -ECG and Labs reviewed  -Aspirin 81mg po pre-cath***  -NS 250mL IV bolus pre-cath***  -procedure discussed with patient; risks and benefits explained, questions answered  -consent obtained by attending IC      Risks, benefits, and alternatives reviewed.  Risks including but not limited to MI, death, stroke, bleeding, infection, vessel injury, hematoma, renal failure, allergic reaction, urgent open heart surgery, restenosis and stent thrombosis were reviewed.  All questions answered.  Patient is agreeable to proceed.                                                                              Department of Cardiology                                                                  [facility]/[place] [address] [facility]                                                            Telephone: [phone]. Fax:[phone]                                                                             Pre- Procedure Progress Note      HPI:  89yo female with h/o HTN, HLD, DM, CAD, MI, PCI to LAD 2018, HFrEF with LVEF 30-35%, atrial tachycardia ?AF on xarelto, AS, previously evaluated 4/2022 for possible TAVR to treat AS but refused intervention at that time, followed by Dr Humphreys and endorsing increasing SOB and wants to pursue TAVR w/u, previous DSE 2/2022 with mod AS resting and peak dose dobutamine, YOLANDA 4/2022 with severe LF/LG AS with DANIELLE 0.63cm2, P/MG 30/16mmHg, DI 0.22, repeat TTE 10/2022 with DANIELLE 0.8cm2, P/MG 27/15mmHg, DI 0.25, LVEF 30-35%, now presents for [facility] to R/O CAD as part of TAVR w/u.        Symptoms:        Angina (Class): III       Ischemic Symptoms: HAQUE angina equivalent    Heart Failure:        Systolic/Diastolic/Combined: HFrEF       NYHA Class (within 2 weeks): III    Assessment of LVEF:       EF: 30-35%       Assessed by: TTE       Date: 10/2022    Echo 10/11/2022:  PHYSICIAN INTERPRETATION:  Left Ventricle: Endocardial visualization was enhanced with intravenous   echo contrast. The left ventricular internal cavity size is normal.  Global LV systolic function was severely decreased. Left ventricular   ejection fraction, by visual estimation, is 30 to 35%. Spectral Doppler   shows pseudonormal pattern of left ventricular myocardial filling (Grade   II diastolic dysfunction).  Right Ventricle: Normal right ventricular size and function.  Left Atrium: Severely enlarged left atrium.  Right Atrium: Normal right atrial size.  Pericardium: There is no evidence of pericardial effusion.  Mitral Valve: The mitral valve leaflets are tethered which is due to   reduced systolic function and elevated LVDP. There is moderate mitral   annular calcification. Mild mitral valve regurgitation is seen.  Tricuspid Valve: The tricuspid valve is normal in structure. Mild   tricuspid regurgitation is visualized.  Aortic Valve: The aortic valve is trileaflet. Trivial aortic valve   regurgitation is seen. Low-flow, low-gradient low EF severe aortic   stenosis. On 2D appears true aortic stenosis. Cannot rule out   pseudo-aortic stenosis. Recommend Dobutmaine stress echo or CT calcium   score to evaluate for true aortic stenosis if clinical diagnosis in   question. The Dimesionless Index value is .25.  Pulmonic Valve: Mild pulmonic valve regurgitation.  Aorta: Aortic root measured at Sinus of Valsalva is normal. There is mild   aortic root calcification.  Pulmonary Artery: The pulmonary artery is of normal size and origin.      Summary:   1. Technically limited study. Endocardial visualization was enhanced   with intravenous echo contrast.   2. Severely enlarged left atrium.   3. Left ventricular ejection fraction, by visual estimation, is 30 to   35%.   4. Normal right atrial size.   5. Normal right ventricular size and function.   6. Mild mitral valve regurgitation.   7. Low-flow, low-gradient low EF severe aortic stenosis. On 2D appears   true aortic stenosis. Cannot rule out pseudo-aortic stenosis. Recommend   Dobutmaine stress echo or CT calcium score to evaluate for true aortic   stenosis if clinical diagnosis in question.   8. Mild tricuspid regurgitation.   9. There is no evidence of pericardial effusion.  10. There is mild aortic root calcification.     YOLANDA 4/4/23:  Aortic Valve: Peak transaortic gradient equals 30.8 mmHg, mean   transaortic gradient equals 15.8 mmHg, the calculated aortic valve area   equals 0.63 cm² by the continuity equation consistent with severe aortic   stenosis. The Dimesionless Index value is 0.22.  Pulmonic Valve: Trace pulmonic valve regurgitation.  Aorta: The aortic root and ascending aorta are structurally normal, with   no evidence of dilitation. There is severe aortic root calcification.  In comparison to the previous echocardiogram(s): There are no prior   studies on this patient for comparison purposes.    Summary:   1. Left ventricular ejection fraction, by visual estimation, is 35 to   40%.   2. Moderately decreased global left ventricular systolic function.   3. Spectral Doppler shows impaired relaxation pattern of left   ventricular myocardial filling (Grade I diastolic dysfunction).   4. No left atrial appendage thrombus and decreased left atrial appendage   velocities.   5. Intact intra-atrial septum without shunt, via color Doppler.   6. Severely enlarged left atrium.   7. Normal right ventricular size and function.   8. Moderate tricuspid regurgitation.   9. Mild mitral valve regurgitation.  10. Peak transaortic gradient equals 30.8 mmHg, mean transaortic gradient   equals 15.8 mmHg, the calculated aortic valve area equals 0.63 cm² by the   continuity equation consistent with severe aortic stenosis. These   findings are consistent with low flow low gradient severe AS.  11. The Dimesionless Index value is 0.22.  12. There is severe aortic root calcification.  13. There is no evidence of pericardial effusion.  14. There are no prior studies on this patient for comparison purposes.    [facility] 7/12/2019:  CORONARY VESSELS: The coronary circulation is left dominant.    LM:   --  LM: Normal. The vessel was normal sized, not calcified, and not  tortuous. Angiography showed no evidence of disease.    LAD:   --  LAD: Normal. The vessel was normalsized, not calcified, and not  tortuous. Angiography showed minor luminal irregularities with no flow  limiting lesions.  Patent stent in the prox LAD.    CX:   --  Circumflex: Normal. The vessel was normal sized, not calcified,  and not tortuous. Angiography showed minor luminal irregularities with no  flow limiting lesions.    RCA:   --  RCA: The vessel was small (non-dominant), not calcified, and not  tortuous. Angiography showed minor luminal irregularities with no flow  limiting lesions.         Associated Risk Factors:        Frailty Assessment: (none/mild/mod/severe)       Cerebrovascular Disease: N/A       Chronic Lung Disease: N/A       Peripheral Arterial Disease: N/A       Chronic Kidney Disease (if yes, what is GFR): N/A       Uncontrolled Diabetes (if yes, what is HgbA1C or FBS): N/A       Poorly Controlled Hypertension (if yes, what is SBP): N/A       Morbid Obesity (if yes, what is BMI): N/A       History of Recent Ventricular Arrhythmia: N/A       Inability to Ambulate Safely: N/A       Need for Therapeutic Anticoagulation: N/A       Antiplatelet or Contrast Allergy: N/A    Antianginal Therapies:        Beta Blockers:  metoprolol       Calcium Channel Blockers:        Long Acting Nitrates:        Ranexa:      89yo female with h/o HTN, HLD, DM, CAD, MI, PCI to LAD 2018, HFrEF with LVEF 30-35%, atrial tachycardia ?AF on xarelto, AS, previously evaluated 4/2022 for possible TAVR to treat AS but refused intervention at that time, followed by Dr Humphreys and endorsing increasing SOB and wants to pursue TAVR w/u, previous DSE 2/2022 with mod AS resting and peak dose dobutamine, YOLANDA 4/2022 with severe LF/LG AS with DANIELLE 0.63cm2, P/MG 30/16mmHg, DI 0.22, repeat TTE 10/2022 with DANIELLE 0.8cm2, P/MG 27/15mmHg, DI 0.25, LVEF 30-35%, now presents for [facility] to R/O CAD as part of TAVR w/u.        Symptoms:        Angina (Class): III       Ischemic Symptoms: HAQUE angina equivalent    Heart Failure:        Systolic/Diastolic/Combined: HFrEF       NYHA Class (within 2 weeks): III    Assessment of LVEF:       EF: 30-35%       Assessed by: TTE       Date: 10/2022    Echo 10/11/2022:  PHYSICIAN INTERPRETATION:  Left Ventricle: Endocardial visualization was enhanced with intravenous   echo contrast. The left ventricular internal cavity size is normal.  Global LV systolic function was severely decreased. Left ventricular   ejection fraction, by visual estimation, is 30 to 35%. Spectral Doppler   shows pseudonormal pattern of left ventricular myocardial filling (Grade   II diastolic dysfunction).  Right Ventricle: Normal right ventricular size and function.  Left Atrium: Severely enlarged left atrium.  Right Atrium: Normal right atrial size.  Pericardium: There is no evidence of pericardial effusion.  Mitral Valve: The mitral valve leaflets are tethered which is due to   reduced systolic function and elevated LVDP. There is moderate mitral   annular calcification. Mild mitral valve regurgitation is seen.  Tricuspid Valve: The tricuspid valve is normal in structure. Mild   tricuspid regurgitation is visualized.  Aortic Valve: The aortic valve is trileaflet. Trivial aortic valve   regurgitation is seen. Low-flow, low-gradient low EF severe aortic   stenosis. On 2D appears true aortic stenosis. Cannot rule out   pseudo-aortic stenosis. Recommend Dobutmaine stress echo or CT calcium   score to evaluate for true aortic stenosis if clinical diagnosis in   question. The Dimesionless Index value is .25.  Pulmonic Valve: Mild pulmonic valve regurgitation.  Aorta: Aortic root measured at Sinus of Valsalva is normal. There is mild   aortic root calcification.  Pulmonary Artery: The pulmonary artery is of normal size and origin.      Summary:   1. Technically limited study. Endocardial visualization was enhanced   with intravenous echo contrast.   2. Severely enlarged left atrium.   3. Left ventricular ejection fraction, by visual estimation, is 30 to   35%.   4. Normal right atrial size.   5. Normal right ventricular size and function.   6. Mild mitral valve regurgitation.   7. Low-flow, low-gradient low EF severe aortic stenosis. On 2D appears   true aortic stenosis. Cannot rule out pseudo-aortic stenosis. Recommend   Dobutmaine stress echo or CT calcium score to evaluate for true aortic   stenosis if clinical diagnosis in question.   8. Mild tricuspid regurgitation.   9. There is no evidence of pericardial effusion.  10. There is mild aortic root calcification.     YOLANDA 4/4/23:  Aortic Valve: Peak transaortic gradient equals 30.8 mmHg, mean   transaortic gradient equals 15.8 mmHg, the calculated aortic valve area   equals 0.63 cm² by the continuity equation consistent with severe aortic   stenosis. The Dimesionless Index value is 0.22.  Pulmonic Valve: Trace pulmonic valve regurgitation.  Aorta: The aortic root and ascending aorta are structurally normal, with   no evidence of dilitation. There is severe aortic root calcification.  In comparison to the previous echocardiogram(s): There are no prior   studies on this patient for comparison purposes.    Summary:   1. Left ventricular ejection fraction, by visual estimation, is 35 to   40%.   2. Moderately decreased global left ventricular systolic function.   3. Spectral Doppler shows impaired relaxation pattern of left   ventricular myocardial filling (Grade I diastolic dysfunction).   4. No left atrial appendage thrombus and decreased left atrial appendage   velocities.   5. Intact intra-atrial septum without shunt, via color Doppler.   6. Severely enlarged left atrium.   7. Normal right ventricular size and function.   8. Moderate tricuspid regurgitation.   9. Mild mitral valve regurgitation.  10. Peak transaortic gradient equals 30.8 mmHg, mean transaortic gradient   equals 15.8 mmHg, the calculated aortic valve area equals 0.63 cm² by the   continuity equation consistent with severe aortic stenosis. These   findings are consistent with low flow low gradient severe AS.  11. The Dimesionless Index value is 0.22.  12. There is severe aortic root calcification.  13. There is no evidence of pericardial effusion.  14. There are no prior studies on this patient for comparison purposes.    [facility] 7/12/2019:  CORONARY VESSELS: The coronary circulation is left dominant.    LM:   --  LM: Normal. The vessel was normal sized, not calcified, and not  tortuous. Angiography showed no evidence of disease.    LAD:   --  LAD: Normal. The vessel was normalsized, not calcified, and not  tortuous. Angiography showed minor luminal irregularities with no flow  limiting lesions.  Patent stent in the prox LAD.    CX:   --  Circumflex: Normal. The vessel was normal sized, not calcified,  and not tortuous. Angiography showed minor luminal irregularities with no  flow limiting lesions.    RCA:   --  RCA: The vessel was small (non-dominant), not calcified, and not  tortuous. Angiography showed minor luminal irregularities with no flow  limiting lesions.         Associated Risk Factors:        Frailty Assessment: (none/mild/mod/severe) MOD       Cerebrovascular Disease: N/A       Chronic Lung Disease: N/A       Peripheral Arterial Disease: N/A       Chronic Kidney Disease (if yes, what is GFR): N/A       Uncontrolled Diabetes (if yes, what is HgbA1C or FBS): N/A       Poorly Controlled Hypertension (if yes, what is SBP): N/A       Morbid Obesity (if yes, what is BMI): N/A       History of Recent Ventricular Arrhythmia: N/A       Inability to Ambulate Safely: N/A       Need for Therapeutic Anticoagulation: N/A       Antiplatelet or Contrast Allergy: N/A    Antianginal Therapies:        Beta Blockers:  metoprolol

## 2023-03-22 NOTE — H&P PST ADULT - ADMIT DATE
The groin was prepped. The site was prepped with ChloraPrep. The site was clipped. The patient was draped. The patient was positioned supine.  23-Mar-2023

## 2023-03-22 NOTE — H&P PST ADULT - ASSESSMENT
Indication: 88yo female with progressive HAQUE, NYHA HF class III, known severe low flow/low gradient AS, TAVR w/u inprogress, plan for [facility] to R/O CAD/ISR.     Risk Stratification:  ASA:  Mallampati:  Bleeding Risk:  Creatinine:  GFR:    Coronary anatomy:  [facility] 7/12/2019:  CORONARY VESSELS: The coronary circulation is left dominant.    LM:   --  LM: Normal. The vessel was normal sized, not calcified, and not  tortuous. Angiography showed no evidence of disease.    LAD:   --  LAD: Normal. The vessel was normalsized, not calcified, and not  tortuous. Angiography showed minor luminal irregularities with no flow  limiting lesions.  Patent stent in the prox LAD.    CX:   --  Circumflex: Normal. The vessel was normal sized, not calcified,  and not tortuous. Angiography showed minor luminal irregularities with no  flow limiting lesions.    RCA:   --  RCA: The vessel was small (non-dominant), not calcified, and not  tortuous. Angiography showed minor luminal irregularities with no flow  limiting lesions.    Plan/Recommendations:   -plan for ****  -preferred access: RRA ***  -patient seen and examined  -confirmed appropriate NPO duration  -ECG and Labs reviewed  -Aspirin 81mg po pre-cath***  -NS 250mL IV bolus pre-cath***  -procedure discussed with patient; risks and benefits explained, questions answered  -consent obtained by attending IC      Risks, benefits, and alternatives reviewed.  Risks including but not limited to MI, death, stroke, bleeding, infection, vessel injury, hematoma, renal failure, allergic reaction, urgent open heart surgery, restenosis and stent thrombosis were reviewed.  All questions answered.  Patient is agreeable to proceed.      Indication: 89yo female with progressive HAQUE, NYHA HF class III, known severe low flow/low gradient AS, TAVR w/u inprogress, plan for LHC to R/O CAD/ISR.     Risk Stratification:  ASA:  Mallampati:  Bleeding Risk:  Creatinine:  GFR:    Coronary anatomy:  [facility] 7/12/2019:  CORONARY VESSELS: The coronary circulation is left dominant.    LM:   --  LM: Normal. The vessel was normal sized, not calcified, and not  tortuous. Angiography showed no evidence of disease.    LAD:   --  LAD: Normal. The vessel was normalsized, not calcified, and not  tortuous. Angiography showed minor luminal irregularities with no flow  limiting lesions.  Patent stent in the prox LAD.    CX:   --  Circumflex: Normal. The vessel was normal sized, not calcified,  and not tortuous. Angiography showed minor luminal irregularities with no  flow limiting lesions.    RCA:   --  RCA: The vessel was small (non-dominant), not calcified, and not  tortuous. Angiography showed minor luminal irregularities with no flow  limiting lesions.    Plan/Recommendations:   -plan for LHC +/- PCI  -preferred access: RRA ***  -patient seen and examined  -confirmed appropriate NPO duration  -ECG and Labs reviewed  -Aspirin 81mg po pre-cath***  -NS 250mL IV bolus pre-cath***  -procedure discussed with patient; risks and benefits explained, questions answered  -consent obtained by attending IC      Risks, benefits, and alternatives reviewed.  Risks including but not limited to MI, death, stroke, bleeding, infection, vessel injury, hematoma, renal failure, allergic reaction, urgent open heart surgery, restenosis and stent thrombosis were reviewed.  All questions answered.  Patient is agreeable to proceed.      Indication: 88yo female with progressive HAQUE, NYHA HF class III, known severe low flow/low gradient AS, TAVR w/u inprogress, plan for LHC to R/O CAD/ISR.     Risk Stratification:  ASA: 3  Mallampati: 2  Bleeding Risk:  Creatinine:  GFR:    Coronary anatomy:  [facility] 7/12/2019:  CORONARY VESSELS: The coronary circulation is left dominant.    LM:   --  LM: Normal. The vessel was normal sized, not calcified, and not  tortuous. Angiography showed no evidence of disease.    LAD:   --  LAD: Normal. The vessel was normalsized, not calcified, and not  tortuous. Angiography showed minor luminal irregularities with no flow  limiting lesions.  Patent stent in the prox LAD.    CX:   --  Circumflex: Normal. The vessel was normal sized, not calcified,  and not tortuous. Angiography showed minor luminal irregularities with no  flow limiting lesions.    RCA:   --  RCA: The vessel was small (non-dominant), not calcified, and not  tortuous. Angiography showed minor luminal irregularities with no flow  limiting lesions.    Plan/Recommendations:   -plan for LHC +/- PCI  -preferred access: RRA   -patient seen and examined  -confirmed appropriate NPO duration  -ECG and Labs reviewed  -Aspirin 81mg po pre-cath  -NS 250mL IV bolus pre-cath held secondary to low EF  -procedure discussed with patient; risks and benefits explained, questions answered  -consent obtained by attending IC      Risks, benefits, and alternatives reviewed.  Risks including but not limited to MI, death, stroke, bleeding, infection, vessel injury, hematoma, renal failure, allergic reaction, urgent open heart surgery, restenosis and stent thrombosis were reviewed.  All questions answered.  Patient is agreeable to proceed.      Indication: 88yo female with progressive HAQUE, NYHA HF class III, known severe low flow/low gradient AS, TAVR w/u inprogress, plan for LHC to R/O CAD/ISR.     Risk Stratification:  ASA: 3  Mallampati: 2  Bleeding Risk: 2.1%  Creatinine: 0.73  GFR: 79    Coronary anatomy:  [facility] 7/12/2019:  CORONARY VESSELS: The coronary circulation is left dominant.    LM:   --  LM: Normal. The vessel was normal sized, not calcified, and not  tortuous. Angiography showed no evidence of disease.    LAD:   --  LAD: Normal. The vessel was normalsized, not calcified, and not  tortuous. Angiography showed minor luminal irregularities with no flow  limiting lesions.  Patent stent in the prox LAD.    CX:   --  Circumflex: Normal. The vessel was normal sized, not calcified,  and not tortuous. Angiography showed minor luminal irregularities with no  flow limiting lesions.    RCA:   --  RCA: The vessel was small (non-dominant), not calcified, and not  tortuous. Angiography showed minor luminal irregularities with no flow  limiting lesions.    Plan/Recommendations:   -plan for LHC +/- PCI  -preferred access: RRA   -patient seen and examined  -confirmed appropriate NPO duration  -ECG and Labs reviewed  -Aspirin 81mg po pre-cath  -NS 250mL IV bolus pre-cath held secondary to low EF  -procedure discussed with patient; risks and benefits explained, questions answered  -consent obtained by attending IC      Risks, benefits, and alternatives reviewed.  Risks including but not limited to MI, death, stroke, bleeding, infection, vessel injury, hematoma, renal failure, allergic reaction, urgent open heart surgery, restenosis and stent thrombosis were reviewed.  All questions answered.  Patient is agreeable to proceed.

## 2023-03-23 ENCOUNTER — TRANSCRIPTION ENCOUNTER (OUTPATIENT)
Age: 88
End: 2023-03-23

## 2023-03-23 ENCOUNTER — OUTPATIENT (OUTPATIENT)
Dept: OUTPATIENT SERVICES | Facility: HOSPITAL | Age: 88
LOS: 1 days | Discharge: HOME | End: 2023-03-23
Payer: MEDICARE

## 2023-03-23 VITALS
SYSTOLIC BLOOD PRESSURE: 142 MMHG | HEART RATE: 62 BPM | RESPIRATION RATE: 15 BRPM | DIASTOLIC BLOOD PRESSURE: 60 MMHG | OXYGEN SATURATION: 97 %

## 2023-03-23 VITALS
HEART RATE: 60 BPM | SYSTOLIC BLOOD PRESSURE: 168 MMHG | DIASTOLIC BLOOD PRESSURE: 77 MMHG | TEMPERATURE: 98 F | OXYGEN SATURATION: 98 % | RESPIRATION RATE: 17 BRPM

## 2023-03-23 DIAGNOSIS — I35.0 NONRHEUMATIC AORTIC (VALVE) STENOSIS: ICD-10-CM

## 2023-03-23 DIAGNOSIS — Z98.61 CORONARY ANGIOPLASTY STATUS: Chronic | ICD-10-CM

## 2023-03-23 LAB
ANION GAP SERPL CALC-SCNC: 11 MMOL/L — SIGNIFICANT CHANGE UP (ref 5–17)
BASOPHILS # BLD AUTO: 0.04 K/UL — SIGNIFICANT CHANGE UP (ref 0–0.2)
BASOPHILS NFR BLD AUTO: 0.5 % — SIGNIFICANT CHANGE UP (ref 0–2)
BUN SERPL-MCNC: 20.8 MG/DL — HIGH (ref 8–20)
CALCIUM SERPL-MCNC: 9.6 MG/DL — SIGNIFICANT CHANGE UP (ref 8.4–10.5)
CHLORIDE SERPL-SCNC: 106 MMOL/L — SIGNIFICANT CHANGE UP (ref 96–108)
CO2 SERPL-SCNC: 24 MMOL/L — SIGNIFICANT CHANGE UP (ref 22–29)
CREAT SERPL-MCNC: 0.73 MG/DL — SIGNIFICANT CHANGE UP (ref 0.5–1.3)
EGFR: 79 ML/MIN/1.73M2 — SIGNIFICANT CHANGE UP
EOSINOPHIL # BLD AUTO: 0.26 K/UL — SIGNIFICANT CHANGE UP (ref 0–0.5)
EOSINOPHIL NFR BLD AUTO: 3.1 % — SIGNIFICANT CHANGE UP (ref 0–6)
GLUCOSE SERPL-MCNC: 90 MG/DL — SIGNIFICANT CHANGE UP (ref 70–99)
HCT VFR BLD CALC: 41.5 % — SIGNIFICANT CHANGE UP (ref 34.5–45)
HGB BLD-MCNC: 13.9 G/DL — SIGNIFICANT CHANGE UP (ref 11.5–15.5)
IMM GRANULOCYTES NFR BLD AUTO: 0.5 % — SIGNIFICANT CHANGE UP (ref 0–0.9)
LYMPHOCYTES # BLD AUTO: 2.41 K/UL — SIGNIFICANT CHANGE UP (ref 1–3.3)
LYMPHOCYTES # BLD AUTO: 28.7 % — SIGNIFICANT CHANGE UP (ref 13–44)
MAGNESIUM SERPL-MCNC: 1.8 MG/DL — SIGNIFICANT CHANGE UP (ref 1.6–2.6)
MCHC RBC-ENTMCNC: 32.3 PG — SIGNIFICANT CHANGE UP (ref 27–34)
MCHC RBC-ENTMCNC: 33.5 GM/DL — SIGNIFICANT CHANGE UP (ref 32–36)
MCV RBC AUTO: 96.3 FL — SIGNIFICANT CHANGE UP (ref 80–100)
MONOCYTES # BLD AUTO: 0.97 K/UL — HIGH (ref 0–0.9)
MONOCYTES NFR BLD AUTO: 11.5 % — SIGNIFICANT CHANGE UP (ref 2–14)
NEUTROPHILS # BLD AUTO: 4.68 K/UL — SIGNIFICANT CHANGE UP (ref 1.8–7.4)
NEUTROPHILS NFR BLD AUTO: 55.7 % — SIGNIFICANT CHANGE UP (ref 43–77)
PLATELET # BLD AUTO: 192 K/UL — SIGNIFICANT CHANGE UP (ref 150–400)
POTASSIUM SERPL-MCNC: 3.9 MMOL/L — SIGNIFICANT CHANGE UP (ref 3.5–5.3)
POTASSIUM SERPL-SCNC: 3.9 MMOL/L — SIGNIFICANT CHANGE UP (ref 3.5–5.3)
RBC # BLD: 4.31 M/UL — SIGNIFICANT CHANGE UP (ref 3.8–5.2)
RBC # FLD: 13.3 % — SIGNIFICANT CHANGE UP (ref 10.3–14.5)
SODIUM SERPL-SCNC: 141 MMOL/L — SIGNIFICANT CHANGE UP (ref 135–145)
WBC # BLD: 8.4 K/UL — SIGNIFICANT CHANGE UP (ref 3.8–10.5)
WBC # FLD AUTO: 8.4 K/UL — SIGNIFICANT CHANGE UP (ref 3.8–10.5)

## 2023-03-23 PROCEDURE — 83735 ASSAY OF MAGNESIUM: CPT

## 2023-03-23 PROCEDURE — 93458 L HRT ARTERY/VENTRICLE ANGIO: CPT | Mod: 26

## 2023-03-23 PROCEDURE — 99152 MOD SED SAME PHYS/QHP 5/>YRS: CPT

## 2023-03-23 PROCEDURE — 85025 COMPLETE CBC W/AUTO DIFF WBC: CPT

## 2023-03-23 PROCEDURE — C1894: CPT

## 2023-03-23 PROCEDURE — 93306 TTE W/DOPPLER COMPLETE: CPT

## 2023-03-23 PROCEDURE — C1769: CPT

## 2023-03-23 PROCEDURE — 93005 ELECTROCARDIOGRAM TRACING: CPT

## 2023-03-23 PROCEDURE — 36415 COLL VENOUS BLD VENIPUNCTURE: CPT

## 2023-03-23 PROCEDURE — 93010 ELECTROCARDIOGRAM REPORT: CPT

## 2023-03-23 PROCEDURE — 93458 L HRT ARTERY/VENTRICLE ANGIO: CPT

## 2023-03-23 PROCEDURE — C1887: CPT

## 2023-03-23 PROCEDURE — 80048 BASIC METABOLIC PNL TOTAL CA: CPT

## 2023-03-23 RX ORDER — METOPROLOL TARTRATE 50 MG
0.5 TABLET ORAL
Qty: 0 | Refills: 0 | DISCHARGE

## 2023-03-23 RX ORDER — RIVAROXABAN 15 MG-20MG
1 KIT ORAL
Qty: 0 | Refills: 0 | DISCHARGE

## 2023-03-23 RX ORDER — ASPIRIN/CALCIUM CARB/MAGNESIUM 324 MG
1 TABLET ORAL
Qty: 0 | Refills: 0 | DISCHARGE

## 2023-03-23 RX ORDER — CETIRIZINE HYDROCHLORIDE 10 MG/1
1 TABLET ORAL
Qty: 0 | Refills: 0 | DISCHARGE

## 2023-03-23 RX ORDER — SPIRONOLACTONE 25 MG/1
0 TABLET, FILM COATED ORAL
Qty: 0 | Refills: 0 | DISCHARGE

## 2023-03-23 RX ORDER — ASPIRIN/CALCIUM CARB/MAGNESIUM 324 MG
81 TABLET ORAL ONCE
Refills: 0 | Status: COMPLETED | OUTPATIENT
Start: 2023-03-23 | End: 2023-03-23

## 2023-03-23 RX ORDER — SACUBITRIL AND VALSARTAN 24; 26 MG/1; MG/1
1 TABLET, FILM COATED ORAL
Qty: 0 | Refills: 0 | DISCHARGE

## 2023-03-23 RX ADMIN — Medication 81 MILLIGRAM(S): at 17:21

## 2023-03-23 NOTE — DISCHARGE NOTE PROVIDER - CARE PROVIDER_API CALL
Maged Humphreys)  Cardiovascular Disease; Internal Medicine; Interventional Cardiology  [address] [place] [address]  Phone: [phone]  Fax: [phone]  Established Patient  Follow Up Time: 2 weeks

## 2023-03-23 NOTE — PROGRESS NOTE ADULT - ASSESSMENT
A/P: 89yo female with h/o HTN, HLD, DM, CAD, MI, PCI to LAD 2018, HFrEF with LVEF 30-35%, atrial tachycardia ?AF on xarelto, AS, previously evaluated 4/2022 for possible TAVR to treat AS but refused intervention at that time, followed by Dr Humphreys and endorsing increasing SOB and wants to pursue TAVR w/u, previous DSE 2/2022 with mod AS resting and peak dose dobutamine, YOLANDA 4/2022 with severe LF/LG AS with DANIELLE 0.63cm2, P/MG 30/16mmHg, DI 0.22, repeat TTE 10/2022 with DANIELLE 0.8cm2, P/MG 27/15mmHg, DI 0.25, LVEF 30-35%, now presents for LHC to R/O CAD as part of TAVR w/u.  Now s/p s/p LHC via RFA with Dr. Humphreys: mild CAD (minor irregularities); Reduced LV Function (LVEF=40-45% and LVEF=19mmHg); Moderate Aortic Stenosis (AVG=15mmHg).  -Bedrest with HOB < 30 degrees x 2 hours post removal of RFA #5Fr sheath by RN; then OOB and ambulate if cath site remains stable  -May discharge to home 3 hours post removal of RFA sheath at 2130 if remains stable  -Follow up with Dr. Humphreys in one to two weeks as outpatient  -Continue current med regimen; restart xarelto tomorrow morning  -Activity instructions discussed with patient verbal understanding  -Discussed with Dr. Humphreys

## 2023-03-23 NOTE — ASU PATIENT PROFILE, ADULT - AS SC BRADEN ACTIVITY
You can access the FollowMyHealth Patient Portal offered by [facility] by registering at the following website: [email or web address]. By joining [employer]’s FollowMyHealth portal, you will also be able to view your health information using other applications (apps) compatible with our system.
(3) walks occasionally

## 2023-03-23 NOTE — DISCHARGE NOTE PROVIDER - HOSPITAL COURSE
88yo female with h/o HTN, HLD, DM, CAD, MI, PCI to LAD 2018, HFrEF with LVEF 30-35%, atrial tachycardia ?AF on xarelto, AS, previously evaluated 4/2022 for possible TAVR to treat AS but refused intervention at that time, followed by Dr Humphreys and endorsing increasing SOB and wants to pursue TAVR w/u, previous DSE 2/2022 with mod AS resting and peak dose dobutamine, YOLANDA 4/2022 with severe LF/LG AS with DANIELLE 0.63cm2, P/MG 30/16mmHg, DI 0.22, repeat TTE 10/2022 with DANIELLE 0.8cm2, P/MG 27/15mmHg, DI 0.25, LVEF 30-35%, now presents for LHC to R/O CAD as part of TAVR w/u.  Now s/p s/p LHC via RFA with Dr. Humphreys: mild CAD (minor irregularities); Reduced LV Function (LVEF=40-45% and LVEF=19mmHg); Moderate Aortic Stenosis (AVG=15mmHg).  -Bedrest with HOB < 30 degrees x 2 hours post removal of RFA #5Fr sheath by RN; then OOB and ambulate if cath site remains stable  -May discharge to home 3 hours post removal of RFA sheath at 2130 if remains stable  -Follow up with Dr. Humphreys in one to two weeks as outpatient  -Continue current med regimen; restart xarelto tomorrow morning  -Activity instructions discussed with patient verbal understanding  -Discussed with Dr. Humphreys

## 2023-03-23 NOTE — DISCHARGE NOTE PROVIDER - NSDCFUSCHEDAPPT_GEN_ALL_CORE_FT
[facility] [address]  Scheduled Appointment: 03/30/2023    [facility] [address]  Scheduled Appointment: 03/30/2023    Pamela Garcia  [facility]  CARDIOLOGY [address] [facility]  Scheduled Appointment: 05/24/2023

## 2023-03-23 NOTE — DISCHARGE NOTE NURSING/CASE MANAGEMENT/SOCIAL WORK - NSDCPEFALRISK_GEN_ALL_CORE
For information on Fall & Injury Prevention, visit: [email or web address] OR  [email or web address] OR  https://www.cdc.gov/steadi/patient.html

## 2023-03-23 NOTE — DISCHARGE NOTE PROVIDER - NSDCCPCAREPLAN_GEN_ALL_CORE_FT
PRINCIPAL DISCHARGE DIAGNOSIS  Diagnosis: Aortic stenosis  Assessment and Plan of Treatment: medical manangement

## 2023-03-23 NOTE — DISCHARGE NOTE PROVIDER - NSDCMRMEDTOKEN_GEN_ALL_CORE_FT
Aspir 81 oral delayed release tablet: 1  orally 2 times a day  Entresto 49 mg-51 mg oral tablet: 1 tab(s) orally 2 times a day  glipiZIDE 2.5 mg oral tablet, extended release: 1 tab(s) orally once a day  metoprolol tartrate 50 mg oral tablet: 0.5 tab(s) orally 2 times a day  pravastatin 20 mg oral tablet: 1 tab(s) orally once a day  spironolactone 25 mg oral tablet: orally once a day  Xarelto 20 mg oral tablet: 1 tab(s) orally once a day (in the evening)    Start tomorrow 3/24/23 morning  ZyrTEC 10 mg oral tablet: 1 tab(s) orally once a day

## 2023-03-23 NOTE — PROGRESS NOTE ADULT - SUBJECTIVE AND OBJECTIVE BOX
Interventional Cardiology post procedure note:     -s/p LHC via RFA with Dr. Humphreys: mild CAD (minor irregularities); Reduced LV Function (LVEF=40-45% and LVEF=19mmHg); Moderate Aortic Stenosis (AVG=15mmHg)       Allergies:  No Known Allergies      PAST MEDICAL & SURGICAL HISTORY:  STEMI involving left anterior descending coronary artery  PCI 2015      HTN (hypertension)      HLD (hyperlipidemia)      CAD (coronary artery disease)      Moderate aortic stenosis      GERD (gastroesophageal reflux disease)      Grade II diastolic dysfunction      Diabetes mellitus      Bilateral knee pain      Excessive use of nonsteroidal anti-inflammatory drug (NSAID)      History of percutaneous coronary intervention  LAD STEMI          Vital Signs Last 24 Hrs  T(C): 36.5 (23 Mar 2023 15:55), Max: 36.5 (23 Mar 2023 15:55)  T(F): 97.7 (23 Mar 2023 15:55), Max: 97.7 (23 Mar 2023 15:55)  HR: 66 (23 Mar 2023 18:50) (58 - 66)  BP: 155/69 (23 Mar 2023 18:50) (132/65 - 168/77)  BP(mean): --  RR: 15 (23 Mar 2023 18:50) (15 - 17)  SpO2: 97% (23 Mar 2023 18:50) (96% - 98%)    Parameters below as of 23 Mar 2023 18:50  Patient On (Oxygen Delivery Method): room air        Physical Exam:  Constitutional: NAD, AAOx3  Cardiovascular: +S1S2 RRR  Pulmonary: CTA b/l, unlabored  GI: soft NTND +BS  Extremities: no pedal edema, +distal pulses b/l  Neuro: non focal, HOWELL x4  Procedure site: RFA procedure site benign without hematoma/bleeding; no bruit; + right PP    LABS:                        13.9   8.40  )-----------( 192      ( 23 Mar 2023 15:30 )             41.5     03-23    141  |  106  |  20.8<H>  ----------------------------<  90  3.9   |  24.0  |  0.73    Ca    9.6      23 Mar 2023 15:30  Mg     1.8     03-23            RADIOLOGY & ADDITIONAL TESTS:  < from: Cardiac Catheterization (03.23.23 @ 17:38) >  Diagnostic Conclusions:     Mild Coronary Artery Disease (Minor Irregularities)   Reduced LV Function (LVEF=40-45% and LVEF=19mmHg)   Moderate Aortic Stenosis (AVG=15mmHg)   Recommendations:   Aggressive medical therapy, Resume all previous activities in 5 days   Will discuss results with Dr. Ribera for possible AVR.      Acute complication:    No complications     Presentation:   89 yo woman with known severe AS(low CO/low gradient) that refused  AVR in the past. Now with worsening  symptoms and multiple hospitalizations in the last year for TAVR  work-up.  Chief Compaint: SOB       < end of copied text >

## 2023-03-23 NOTE — ASU PATIENT PROFILE, ADULT - FALL HARM RISK - HARM RISK INTERVENTIONS

## 2023-03-28 DIAGNOSIS — I25.10 ATHEROSCLEROTIC HEART DISEASE OF NATIVE CORONARY ARTERY WITHOUT ANGINA PECTORIS: ICD-10-CM

## 2023-03-30 ENCOUNTER — OUTPATIENT (OUTPATIENT)
Dept: OUTPATIENT SERVICES | Facility: HOSPITAL | Age: 88
LOS: 1 days | End: 2023-03-30
Payer: MEDICARE

## 2023-03-30 ENCOUNTER — APPOINTMENT (OUTPATIENT)
Dept: CT IMAGING | Facility: CLINIC | Age: 88
End: 2023-03-30
Payer: MEDICARE

## 2023-03-30 ENCOUNTER — RESULT REVIEW (OUTPATIENT)
Age: 88
End: 2023-03-30

## 2023-03-30 DIAGNOSIS — I35.0 NONRHEUMATIC AORTIC (VALVE) STENOSIS: ICD-10-CM

## 2023-03-30 DIAGNOSIS — Z98.61 CORONARY ANGIOPLASTY STATUS: Chronic | ICD-10-CM

## 2023-03-30 PROCEDURE — 71275 CT ANGIOGRAPHY CHEST: CPT | Mod: MH

## 2023-03-30 PROCEDURE — 74174 CTA ABD&PLVS W/CONTRAST: CPT

## 2023-03-30 PROCEDURE — 75574 CT ANGIO HRT W/3D IMAGE: CPT

## 2023-03-30 PROCEDURE — 75574 CT ANGIO HRT W/3D IMAGE: CPT | Mod: 26,MH

## 2023-03-30 PROCEDURE — 74174 CTA ABD&PLVS W/CONTRAST: CPT | Mod: 26,MH

## 2023-03-30 PROCEDURE — 71275 CT ANGIOGRAPHY CHEST: CPT | Mod: 26,MH

## 2023-03-30 NOTE — H&P PST ADULT - NSALCOHOLAMT_GEN_A_CORE_SD
"[address] [facility]  Discharge- Kylah Ham 1964, 62 y o  female MRN: [id number]  Unit/Bed#: [facility] 733-01 Encounter: [id number]  Primary Care Provider: Santana Patino MD   Date and time admitted to hospital: 3/29/2023  1:45 PM    Anxiety  Assessment & Plan  • Continue Wellbutrin 300mg each morning  • Continue Buspar HCL 10mg TID  • Continue Hydroxyzine 50mg as needed at bedtime       Depression  Assessment & Plan  Continue home med latuda, wellbutrin, buspar, trazodone    Cigarette nicotine dependence with nicotine-induced disorder  Assessment & Plan  •  on importance of smoking cessation    * Dizziness  Assessment & Plan  • Patient presented to ED with increased vertigo over the past month  Patient states that the episodes of dizziness/vertigo are occurring about 3 times daily for 5 minutes  However, after the episodes resolve the patient \"still doesn't feel right\"  • dizziness worsens with turning her head  • Labs in ED were largely unremarkable  ? Hemoglobin 14 2  ? Troponin: 3//4  ? Glucose:  80  • CTA head and neck w wo contrast 03/29:  negative  • Patient reports symptoms worsening when she moves her head, her symptoms started in the morning when she rolls over in bed  Her presentation is typical for BPPV  Will discharge with outpatient vestibular therapy        Medical Problems     Resolved Problems  Date Reviewed: 3/30/2023   None       Discharging Physician / Practitioner: Kenisha Li DO  PCP: Santana Patino MD  Admission Date:   Admission Orders (From admission, onward)     Ordered        03/29/23 1848  Place in Observation  Once                      Discharge Date: 03/30/23    Consultations During Hospital Stay:  · none    Procedures Performed:   ·     Significant Findings / Test Results:   CTA head and neck w wo contrast   Final Result by Becky Carty MD (03/29 1938)      No significant stenosis of the cervical carotid or vertebral arteries   No " "significant intracranial stenosis, large vessel occlusion or aneurysm  Workstation performed: [id number]         ·     Incidental Findings:   ·    · I reviewed the above mentioned incidental findings with the patient and/or family and they expressed understanding  Test Results Pending at Discharge (will require follow up):   · none     Outpatient Tests Requested:  · Pt ot vestibular therapy    Complications:      Reason for Admission: dizziness    Hospital Course:   Kaylah Felton is a 62 y o  female patient who originally presented to the hospital on 3/29/2023 due to dizziness  Patient is most consistent with BPPV, she underwent CT imaging which was negative  Was discharged with physical therapy vestibular treatments as an outpatient    Please see above list of diagnoses and related plan for additional information  Condition at Discharge: stable    Discharge Day Visit / Exam:   Subjective: Patient denies any acute complaints today  Vitals: Blood Pressure: 104/72 (03/30/23 1141)  Pulse: 88 (03/30/23 1141)  Temperature: 97 8 °F (36 6 °C) (03/30/23 0635)  Temp Source: Oral (03/29/23 1344)  Respirations: 18 (03/30/23 1030)  Height: 5' 6\" (167 6 cm) (03/30/23 0405)  Weight - Scale: 111 kg (245 lb) (03/30/23 0405)  SpO2: 95 % (03/30/23 1141)  Exam:   Physical Exam  Vitals and nursing note reviewed  Constitutional:       General: She is not in acute distress  Appearance: She is well-developed  She is not ill-appearing, toxic-appearing or diaphoretic  HENT:      Head: Normocephalic and atraumatic  Eyes:      General: No scleral icterus  Conjunctiva/sclera: Conjunctivae normal    Cardiovascular:      Rate and Rhythm: Normal rate and regular rhythm  Heart sounds: No murmur heard  No friction rub  No gallop  Pulmonary:      Effort: Pulmonary effort is normal  No respiratory distress  Breath sounds: Normal breath sounds  No stridor  No wheezing, rhonchi or rales     Chest: " Chest wall: No tenderness  Abdominal:      General: There is no distension  Palpations: Abdomen is soft  There is no mass  Tenderness: There is no abdominal tenderness  There is no guarding or rebound  Hernia: No hernia is present  Musculoskeletal:         General: No swelling, tenderness, deformity or signs of injury  Cervical back: Neck supple  Skin:     General: Skin is warm and dry  Capillary Refill: Capillary refill takes less than 2 seconds  Neurological:      Mental Status: She is alert and oriented to person, place, and time  Psychiatric:         Mood and Affect: Mood normal           Discussion with Family: Updated  (son) via phone  Discharge instructions/Information to patient and family:   See after visit summary for information provided to patient and family  Provisions for Follow-Up Care:  See after visit summary for information related to follow-up care and any pertinent home health orders  Disposition:   Home    Planned Readmission: no     Discharge Statement:  I spent 35 minutes discharging the patient  This time was spent on the day of discharge  I had direct contact with the patient on the day of discharge  Greater than 50% of the total time was spent examining patient, answering all patient questions, arranging and discussing plan of care with patient as well as directly providing post-discharge instructions  Additional time then spent on discharge activities  Discharge Medications:  See after visit summary for reconciled discharge medications provided to patient and/or family        **Please Note: This note may have been constructed using a voice recognition system** 1-2 drinks

## 2023-04-04 ENCOUNTER — NON-APPOINTMENT (OUTPATIENT)
Age: 88
End: 2023-04-04

## 2023-04-10 ENCOUNTER — NON-APPOINTMENT (OUTPATIENT)
Age: 88
End: 2023-04-10

## 2023-04-10 ENCOUNTER — APPOINTMENT (OUTPATIENT)
Dept: CARDIOLOGY | Facility: CLINIC | Age: 88
End: 2023-04-10
Payer: MEDICARE

## 2023-04-10 VITALS
DIASTOLIC BLOOD PRESSURE: 62 MMHG | WEIGHT: 173.13 LBS | HEIGHT: 60 IN | BODY MASS INDEX: 33.99 KG/M2 | SYSTOLIC BLOOD PRESSURE: 110 MMHG | HEART RATE: 61 BPM | TEMPERATURE: 98 F | OXYGEN SATURATION: 97 %

## 2023-04-10 DIAGNOSIS — I25.118 ATHEROSCLEROTIC HEART DISEASE OF NATIVE CORONARY ARTERY WITH OTHER FORMS OF ANGINA PECTORIS: ICD-10-CM

## 2023-04-10 PROCEDURE — 99214 OFFICE O/P EST MOD 30 MIN: CPT

## 2023-04-10 PROCEDURE — 93000 ELECTROCARDIOGRAM COMPLETE: CPT

## 2023-04-10 RX ORDER — METOPROLOL TARTRATE 50 MG/1
50 TABLET, FILM COATED ORAL TWICE DAILY
Qty: 90 | Refills: 1 | Status: DISCONTINUED | COMMUNITY
End: 2023-04-10

## 2023-05-03 ENCOUNTER — APPOINTMENT (OUTPATIENT)
Dept: HEART FAILURE | Facility: CLINIC | Age: 88
End: 2023-05-03
Payer: MEDICARE

## 2023-05-03 VITALS
OXYGEN SATURATION: 96 % | TEMPERATURE: 98.9 F | WEIGHT: 173 LBS | DIASTOLIC BLOOD PRESSURE: 76 MMHG | SYSTOLIC BLOOD PRESSURE: 118 MMHG | BODY MASS INDEX: 33.96 KG/M2 | HEIGHT: 60 IN | HEART RATE: 60 BPM

## 2023-05-03 PROCEDURE — 99205 OFFICE O/P NEW HI 60 MIN: CPT

## 2023-05-05 NOTE — ASSESSMENT
[FreeTextEntry1] : Patient is a 89 year old woman with history of CAD, HTN, chronic systolic heart failure, and aortic stenosis who comes in to establish care with heart failure. \par \par # Chronic systolic heart failure: Patient endorses NYHA class 3 symptoms\par -GDMT:  continue with entresto 49/51mg BID, metoprolol and spironolactone 25mg daily. Starting jardiance 10mg daily\par -Diuretics: started on jardiance, will reassess if he also requires loop diuiretics\par -Labs:  repeat labs in 2 weeks\par -Cardiac rehab: referral made\par -HF lifestyle modifications including daily weights, BP log, and low sodium diet reiterated. Pt given daily weight and BP log and verbalized understanding to notify office if >3lb/1 day or >5 lb/1 week weight gain\par \par #Aortic stenosis: The patient has had an extensive workup done and given all the testing likely has moderate to severe AS but her calcium score is only 526 which also points to the AS being not severe\par - will continue to optimize her heart failure medications as above\par - She will have followup appointments with the CTS team but at this time is not a TAVR candidate for the above mentioned reasons\par

## 2023-05-05 NOTE — PHYSICAL EXAM
[Well Developed] : well developed [No Acute Distress] : no acute distress [Normal Conjunctiva] : normal conjunctiva [Normal Venous Pressure] : normal venous pressure [No Carotid Bruit] : no carotid bruit [Normal S1, S2] : normal S1, S2 [No Murmur] : no murmur [Clear Lung Fields] : clear lung fields [Good Air Entry] : good air entry [Soft] : abdomen soft [Non Tender] : non-tender [Normal Gait] : normal gait [No Edema] : no edema [Moves all extremities] : moves all extremities [de-identified] : warm

## 2023-05-05 NOTE — HISTORY OF PRESENT ILLNESS
[FreeTextEntry1] : Patient is a 89 year old woman with history of CAD, HTN, chronic systolic heart failure, and aortic stenosis who comes in to establish care with heart failure. \par \par The has been having SOB with exertion for about a year now. She states her symptoms are variable but more recently in the last couple of months she has been having SOB with any exertional activity. She also reports feels full in the chest when she eats and that the sensation is improved after she takes off her bra. \par \par She was treated with medical therapy for her HFrEF but she continued to have HAQUE so her aortic valve was evaluated further. She had a dobutamine stress TTE which was consistent with moderate AS in 2/2022. She then had a YOLANDA which showed severe AS but at that time did not want to proceed with TAVR. But she continued to remain symptommatic and agreed to undergo more workup. \par TTE:3/23/23: DANIELLE 0.65, AV mG 16, AV velocity: 2.7, Dimensionless Index 0.23, mild AI, mild-mod MR/TR, EF 30%, NL RV, G1DD and  TAVR CT showed that her calcium score was 526. Given the lack of calcium and what appears to be moderate AS or LFLG AS on echo, TAVR is deferred for now. \par \par

## 2023-05-17 ENCOUNTER — LABORATORY RESULT (OUTPATIENT)
Age: 88
End: 2023-05-17

## 2023-05-19 ENCOUNTER — NON-APPOINTMENT (OUTPATIENT)
Age: 88
End: 2023-05-19

## 2023-05-24 ENCOUNTER — APPOINTMENT (OUTPATIENT)
Dept: CARDIOLOGY | Facility: CLINIC | Age: 88
End: 2023-05-24

## 2023-06-07 ENCOUNTER — APPOINTMENT (OUTPATIENT)
Dept: HEART FAILURE | Facility: CLINIC | Age: 88
End: 2023-06-07
Payer: MEDICARE

## 2023-06-07 VITALS
BODY MASS INDEX: 34.36 KG/M2 | HEIGHT: 60 IN | HEART RATE: 62 BPM | WEIGHT: 175 LBS | DIASTOLIC BLOOD PRESSURE: 68 MMHG | SYSTOLIC BLOOD PRESSURE: 116 MMHG | OXYGEN SATURATION: 98 %

## 2023-06-07 PROCEDURE — 99214 OFFICE O/P EST MOD 30 MIN: CPT

## 2023-06-07 NOTE — ASSESSMENT
[FreeTextEntry1] : Patient is a 89 year old woman with history of CAD, HTN, chronic systolic heart failure, and aortic stenosis who comes in to establish care with heart failure. \par \par # Chronic systolic heart failure: Patient endorses NYHA class 3 symptoms\par -GDMT:  continue with entresto 49/51mg BID, metoprolol  and jardiance 10mg daily and spironolactone 25mg daily. \par -Diuretics: started on jardiance, will reassess if she needs \par -Labs:  repeat labs in 2 weeks\par -Cardiac rehab: referral made\par - Device: will recheck EF in 3 months once she has been optimized on meds and discuss primary prevention ICD, however at this time she is not interested but will readdress it\par -HF lifestyle modifications including daily weights, BP log, and low sodium diet reiterated. Pt given daily weight and BP log and verbalized understanding to notify office if >3lb/1 day or >5 lb/1 week weight gain\par \par #Aortic stenosis: The patient has had an extensive workup done and given all the testing likely has moderate to severe AS but her calcium score is only 526 which also points to the AS being not severe\par - will continue to optimize her heart failure medications as above\par - She will have followup appointments with the CTS team but at this time is not a TAVR candidate for the above mentioned reasons\par

## 2023-06-07 NOTE — PHYSICAL EXAM
[Well Developed] : well developed [No Acute Distress] : no acute distress [Normal Conjunctiva] : normal conjunctiva [Normal Venous Pressure] : normal venous pressure [No Carotid Bruit] : no carotid bruit [Normal S1, S2] : normal S1, S2 [No Murmur] : no murmur [Clear Lung Fields] : clear lung fields [Good Air Entry] : good air entry [Soft] : abdomen soft [Non Tender] : non-tender [Normal Gait] : normal gait [No Edema] : no edema [Moves all extremities] : moves all extremities [de-identified] : warm

## 2023-06-07 NOTE — HISTORY OF PRESENT ILLNESS
[FreeTextEntry1] : Patient is a 89 year old woman with history of CAD, HTN, chronic systolic heart failure, and aortic stenosis who comes in to establish care with heart failure. \par \par The has been having SOB with exertion for about a year now. She states her symptoms are variable but more recently in the last couple of months she has been having SOB with any exertional activity. She also reports feels full in the chest when she eats and that the sensation is improved after she takes off her bra. \par \par She was treated with medical therapy for her HFrEF but she continued to have HAQUE so her aortic valve was evaluated further. She had a dobutamine stress TTE which was consistent with moderate AS in 2/2022. She then had a YOLANDA which showed severe AS but at that time did not want to proceed with TAVR. But she continued to remain symptommatic and agreed to undergo more workup. \par TTE:3/23/23: DANIELLE 0.65, AV mG 16, AV velocity: 2.7, Dimensionless Index 0.23, mild AI, mild-mod MR/TR, EF 30%, NL RV, G1DD and  TAVR CT showed that her calcium score was 526. Given the lack of calcium and what appears to be moderate AS or LFLG AS on echo, TAVR is deferred for now. \par \par 6/7/23: Patient comes in for followup. She was started on jardiance last time she came to the office. She notes that her SOB and HAQUE is improved. She however still has SOB especially if she has to walk up stairs or if she bends and stands up she feels SOB\par \par

## 2023-06-07 NOTE — CARDIOLOGY SUMMARY
[de-identified] : SInus with RBBB [de-identified] : TTE March 2023: DANIELLE 0.69cm, DI 0.23, peak AV 29.2mmHg, moderate to severe calcification of the aortic valve, mean 15.7mmHg, LVEf 30-35%, Normal RV, mild to moderate MR, mild to moderate TR, PASP 44.9 [de-identified] : TAVR CT 2023 calcified aortic valve with an Agatston calcium score of 526 [de-identified] : March 2023: normal LM, minor irregularities in LAD, LCx, RCA and ramus, LV to AO pullback pressure gradient of 15

## 2023-06-21 ENCOUNTER — OFFICE (OUTPATIENT)
Dept: URBAN - METROPOLITAN AREA CLINIC 97 | Facility: CLINIC | Age: 88
Setting detail: OPHTHALMOLOGY
End: 2023-06-21
Payer: MEDICARE

## 2023-06-21 VITALS — HEIGHT: 55 IN

## 2023-06-21 DIAGNOSIS — Z96.1: ICD-10-CM

## 2023-06-21 DIAGNOSIS — H43.22: ICD-10-CM

## 2023-06-21 DIAGNOSIS — E11.9: ICD-10-CM

## 2023-06-21 DIAGNOSIS — H16.223: ICD-10-CM

## 2023-06-21 DIAGNOSIS — H26.493: ICD-10-CM

## 2023-06-21 PROCEDURE — 92202 OPSCPY EXTND ON/MAC DRAW: CPT | Performed by: OPHTHALMOLOGY

## 2023-06-21 PROCEDURE — 92014 COMPRE OPH EXAM EST PT 1/>: CPT | Performed by: OPHTHALMOLOGY

## 2023-06-21 ASSESSMENT — KERATOMETRY
OS_AXISANGLE_DEGREES: 122
OD_K2POWER_DIOPTERS: 44.75
OS_K2POWER_DIOPTERS: 45.25
OS_K1POWER_DIOPTERS: 44.75
OD_AXISANGLE_DEGREES: 023
OD_K1POWER_DIOPTERS: 44.25
METHOD_AUTO_MANUAL: AUTO

## 2023-06-21 ASSESSMENT — REFRACTION_MANIFEST
OS_VA1: 20/20-2
OS_AXIS: 165
OD_SPHERE: -0.25
OD_VA1: 20/20
OS_CYLINDER: +0.50
OD_AXIS: 010
OD_CYLINDER: +0.25
OD_VA2: 20/20(J1+)
OS_VA2: 20/20(J1+)
OS_SPHERE: PLANO
OD_ADD: +2.50
OS_ADD: +2.50
OU_VA: 20/20

## 2023-06-21 ASSESSMENT — REFRACTION_CURRENTRX
OD_SPHERE: +2.50
OD_VPRISM_DIRECTION: SV
OD_OVR_VA: 20/
OS_OVR_VA: 20/
OS_SPHERE: +2.50
OS_VPRISM_DIRECTION: SV

## 2023-06-21 ASSESSMENT — REFRACTION_AUTOREFRACTION
OD_AXIS: 010
OD_SPHERE: PLANO
OS_CYLINDER: +0.75
OD_CYLINDER: +0.50
OS_AXIS: 161
OS_SPHERE: -0.25

## 2023-06-21 ASSESSMENT — AXIALLENGTH_DERIVED
OD_AL: 23.2775
OS_AL: 23.0071

## 2023-06-21 ASSESSMENT — TONOMETRY
OS_IOP_MMHG: 17
OD_IOP_MMHG: 15

## 2023-06-21 ASSESSMENT — SPHEQUIV_DERIVED
OS_SPHEQUIV: 0.125
OD_SPHEQUIV: -0.125

## 2023-06-21 ASSESSMENT — CONFRONTATIONAL VISUAL FIELD TEST (CVF)
OS_FINDINGS: FULL
OD_FINDINGS: FULL

## 2023-06-21 ASSESSMENT — VISUAL ACUITY
OS_BCVA: 20/20-2
OD_BCVA: 20/20-2

## 2023-07-05 ENCOUNTER — APPOINTMENT (OUTPATIENT)
Dept: CARDIOLOGY | Facility: CLINIC | Age: 88
End: 2023-07-05
Payer: MEDICARE

## 2023-07-05 ENCOUNTER — NON-APPOINTMENT (OUTPATIENT)
Age: 88
End: 2023-07-05

## 2023-07-05 VITALS
TEMPERATURE: 98.6 F | WEIGHT: 173 LBS | SYSTOLIC BLOOD PRESSURE: 128 MMHG | HEART RATE: 63 BPM | OXYGEN SATURATION: 97 % | BODY MASS INDEX: 33.79 KG/M2 | DIASTOLIC BLOOD PRESSURE: 64 MMHG

## 2023-07-05 DIAGNOSIS — I10 ESSENTIAL (PRIMARY) HYPERTENSION: ICD-10-CM

## 2023-07-05 DIAGNOSIS — I48.91 UNSPECIFIED ATRIAL FIBRILLATION: ICD-10-CM

## 2023-07-05 DIAGNOSIS — E11.9 TYPE 2 DIABETES MELLITUS W/OUT COMPLICATIONS: ICD-10-CM

## 2023-07-05 DIAGNOSIS — I50.1 LEFT VENTRICULAR FAILURE, UNSPECIFIED: ICD-10-CM

## 2023-07-05 PROCEDURE — 99214 OFFICE O/P EST MOD 30 MIN: CPT

## 2023-07-05 PROCEDURE — 93000 ELECTROCARDIOGRAM COMPLETE: CPT

## 2023-07-05 NOTE — DISCUSSION/SUMMARY
[FreeTextEntry1] : 87 yo woman with known CAD and moderate symptomatic aortic stenosis. Cardiac cath in 7/2019 revealed mild irreg of the coronary arteries and DANIELLE=1.8 cm2, reduced EF (30-35%) and mild Pul HTN. \par Was assessed by the Heart Team and because the Ca score was 526 and no significant gradient across the valve, therefore the surgery was psotponed at this time. She was sent to the HF team and was assessed by Dr. Rucker and started on GMT with good response. NYHA 3/4. \par Will continue same meds\par Follow up in 6 months

## 2023-07-05 NOTE — PHYSICAL EXAM
[Well Developed] : well developed [Well Nourished] : well nourished [No Acute Distress] : no acute distress [Normal Conjunctiva] : normal conjunctiva [Normal Venous Pressure] : normal venous pressure [No Carotid Bruit] : no carotid bruit [Normal S1, S2] : normal S1, S2 [No Rub] : no rub [No Gallop] : no gallop [Clear Lung Fields] : clear lung fields [Good Air Entry] : good air entry [No Respiratory Distress] : no respiratory distress  [Soft] : abdomen soft [Non Tender] : non-tender [No Masses/organomegaly] : no masses/organomegaly [Normal Bowel Sounds] : normal bowel sounds [Normal Gait] : normal gait [No Edema] : no edema [No Cyanosis] : no cyanosis [No Clubbing] : no clubbing [No Varicosities] : no varicosities [No Rash] : no rash [No Skin Lesions] : no skin lesions [Moves all extremities] : moves all extremities [No Focal Deficits] : no focal deficits [Normal Speech] : normal speech [Alert and Oriented] : alert and oriented [Normal memory] : normal memory [de-identified] : OPAL 4/6 at LSB with absent S2. Radiates to the left carotid.

## 2023-07-05 NOTE — HISTORY OF PRESENT ILLNESS
[FreeTextEntry1] : 87 yo woman,  from [place] that passed in Dec 2020 from lung cancer and COVID 2019. She has known HTN, DM2 and hyperlipidemia all under medical management. Has known CAD since 12/2015 when she was admitted at [facility] with a myocardial infarction and underwent urgent coronary angiography and PCI to the LAD. Preserved LV function. Since then under medical follow up.\par Last echocardiogram was done in 6/2018 revealed moderate LV dysfunction with EF= 40-45% and moderate AS 1.16 cm2. \par On 7/12/2019 she had a Rt and Lt heart cath that revealed mild irregularities in the coronary arteries, patent stent in the LAD, reduced EF (30%), and mild aortic stenosis with a gradient of 9 mmHg and DANIELLE=1.8 cm2. Right heart pressures were mildly elevated (PAP=27 mmHg). \par Had BE cataract surgery with no complications. \par Had COVID in Dec 2020. Still has not had the vaccine. \par On 3/2021 she had recurrent falls at home, was seen at a PCP and had chest ray and states that she has two broken ribs.\par On 3/30/2021 she was seen at the office with severe SOB. Was referred to the hospital where she was found to have atrial tachycardia. Treated with amiodarone and and metoprolol. Echo done 4/1/2021 revealed severely reduced LV function 25%.\par YOLANDA done in 4/4/2022 revealed a AVG=30.8 mmHg and DANIELLE=0.63 cm2 with LVEF=35-40%\par On 5/8/2022 she had a mechanical fall and had a Rt femur fracture that needed surgical reduction. Since then she has recovered very well under rehab and uses a walker now. \par She was assessed by Dr Ribera in 4/2022 and he thought that the valve was stenotic and need a TAVR. \par LHC 3/23/23: nonobstructive CAD with LV-AO pullback peak-peak gradient of 15mmH (no RHC)\par TAVR Screening CTA 3/30/23: lack of Ca demonstrated on the AV with a Ca Score of 526. Was assessed by the Heart Team Dr. Ribera). Discussion centered on the lack of aortic valve anchoring calcium to implant TAVR suggesting AS is not severe and the patients symptoms may be improved after Heart Failure evaluation. She was seen by Dr. Rucker and started on meds (Entresto/Jardiance/Spironolactone). \par Today at the office for follow up, she C/O SOB. NYHA 3/4 but she is limited by her recent fall and the use of walker. No CP, orthopnea or PND. Denies palpitations, dizziness or ankle swelling. Nocturia. \lauren Had COVID 19 in 1/2021. Has not received the vaccine. \par Denies smoking, has social drinking. Denies the use of illicit drugs.\lauren Was prescribed oxycodone for knee pain but her PCP. Discussed risks and benefits.\par \par

## 2023-07-28 RX ORDER — RIVAROXABAN 20 MG/1
20 TABLET, FILM COATED ORAL
Qty: 90 | Refills: 2 | Status: ACTIVE | COMMUNITY
Start: 2021-03-30 | End: 1900-01-01

## 2023-08-14 NOTE — DISCHARGE NOTE NURSING/CASE MANAGEMENT/SOCIAL WORK - PATIENT PORTAL LINK FT
You can access the FollowMyHealth Patient Portal offered by [facility] by registering at the following website: [email or web address]. By joining [facility] [employer] [facility]’s FollowMyHealth portal, you will also be able to view your health information using other applications (apps) compatible with our system. Birth Control Pills Pregnancy And Lactation Text: This medication should be avoided if pregnant and for the first 30 days post-partum.

## 2023-09-06 ENCOUNTER — APPOINTMENT (OUTPATIENT)
Dept: CARDIOLOGY | Facility: CLINIC | Age: 88
End: 2023-09-06

## 2023-10-05 ENCOUNTER — RX RENEWAL (OUTPATIENT)
Age: 88
End: 2023-10-05

## 2023-10-23 ENCOUNTER — RX RENEWAL (OUTPATIENT)
Age: 88
End: 2023-10-23

## 2023-12-20 ENCOUNTER — APPOINTMENT (OUTPATIENT)
Dept: HEART FAILURE | Facility: CLINIC | Age: 88
End: 2023-12-20
Payer: MEDICARE

## 2023-12-20 VITALS
TEMPERATURE: 98.2 F | SYSTOLIC BLOOD PRESSURE: 130 MMHG | DIASTOLIC BLOOD PRESSURE: 72 MMHG | HEART RATE: 83 BPM | OXYGEN SATURATION: 98 %

## 2023-12-20 DIAGNOSIS — I35.0 NONRHEUMATIC AORTIC (VALVE) STENOSIS: ICD-10-CM

## 2023-12-20 PROCEDURE — 99214 OFFICE O/P EST MOD 30 MIN: CPT

## 2023-12-20 RX ORDER — SACUBITRIL AND VALSARTAN 49; 51 MG/1; MG/1
49-51 TABLET, FILM COATED ORAL
Qty: 180 | Refills: 1 | Status: COMPLETED | COMMUNITY
Start: 2021-03-30 | End: 2023-12-20

## 2023-12-20 RX ORDER — PANTOPRAZOLE 20 MG/1
20 TABLET, DELAYED RELEASE ORAL EVERY MORNING
Refills: 0 | Status: DISCONTINUED | COMMUNITY
End: 2023-12-20

## 2023-12-20 RX ORDER — CHOLECALCIFEROL (VITAMIN D3) 25 MCG
25 TABLET ORAL DAILY
Refills: 0 | Status: ACTIVE | COMMUNITY

## 2023-12-20 NOTE — ASSESSMENT
[FreeTextEntry1] : Patient is a 89 year old woman with history of CAD, HTN, chronic systolic heart failure, and aortic stenosis who comes in to establish care with heart failure.  # Chronic systolic heart failure: Patient endorses NYHA class 3 symptoms -GDMT: Increase entresto to 97/103mg BID. C/w  metoprolol and jardiance 10mg daily and spironolactone 25mg daily. -Diuretics: started on jardiance, will reassess if she needs -Labs: repeat labs in 2 weeks - Device: will recheck EF in 3 months once she has been optimized on meds and discuss primary prevention ICD, however at this time she is not interested but will readdress it -HF lifestyle modifications including daily weights, BP log, and low sodium diet reiterated. Pt given daily weight and BP log and verbalized understanding to notify office if >3lb/1 day or >5 lb/1 week weight gain  #Aortic stenosis: The patient has had an extensive workup done and given all the testing likely has moderate to severe AS but her calcium score is only 526 which also points to the AS being not severe - will continue to optimize her heart failure medications as above - She will have followup appointments with the CTS team but at this time is not a TAVR candidate for the above mentioned reasons .

## 2023-12-20 NOTE — PHYSICAL EXAM
[Well Developed] : well developed [No Acute Distress] : no acute distress [Normal Conjunctiva] : normal conjunctiva [Normal Venous Pressure] : normal venous pressure [No Carotid Bruit] : no carotid bruit [Normal S1, S2] : normal S1, S2 [No Murmur] : no murmur [Clear Lung Fields] : clear lung fields [Good Air Entry] : good air entry [Soft] : abdomen soft [Non Tender] : non-tender [Normal Gait] : normal gait [No Edema] : no edema [Moves all extremities] : moves all extremities [de-identified] : warm

## 2023-12-20 NOTE — CARDIOLOGY SUMMARY
[de-identified] : SInus with RBBB [de-identified] : TTE March 2023: DANIELLE 0.69cm, DI 0.23, peak AV 29.2mmHg, moderate to severe calcification of the aortic valve, mean 15.7mmHg, LVEf 30-35%, Normal RV, mild to moderate MR, mild to moderate TR, PASP 44.9 [de-identified] : TAVR CT 2023 calcified aortic valve with an Agatston calcium score of 526 [de-identified] : March 2023: normal LM, minor irregularities in LAD, LCx, RCA and ramus, LV to AO pullback pressure gradient of 15

## 2023-12-20 NOTE — HISTORY OF PRESENT ILLNESS
[FreeTextEntry1] : Patient is a 89 year old woman with history of CAD, HTN, chronic systolic heart failure, and aortic stenosis who comes in to establish care with heart failure.   The has been having SOB with exertion for about a year now. She states her symptoms are variable but more recently in the last couple of months she has been having SOB with any exertional activity. She also reports feels full in the chest when she eats and that the sensation is improved after she takes off her bra.   She was treated with medical therapy for her HFrEF but she continued to have HAQUE so her aortic valve was evaluated further. She had a dobutamine stress TTE which was consistent with moderate AS in 2/2022. She then had a YOLANDA which showed severe AS but at that time did not want to proceed with TAVR. But she continued to remain symptomatic and agreed to undergo more workup.  TTE:3/23/23: DANIELLE 0.65, AV mG 16, AV velocity: 2.7, Dimensionless Index 0.23, mild AI, mild-mod MR/TR, EF 30%, NL RV, G1DD and  TAVR CT showed that her calcium score was 526. Given the lack of calcium and what appears to be moderate AS or LFLG AS on echo, TAVR is deferred for now.   6/7/23: Patient comes in for followup. She was started on jardiance last time she came to the office. She notes that her SOB and HAQUE is improved. She however still has SOB especially if she has to walk up stairs or if she bends and stands up she feels SOB  12/20/23: Patient comes in for followup. She is taking her medications and feels like she is peeing all the time. She however still feels SOB doing more exertional activities.

## 2023-12-22 ENCOUNTER — RX RENEWAL (OUTPATIENT)
Age: 88
End: 2023-12-22

## 2023-12-22 RX ORDER — EPLERENONE 25 MG/1
25 TABLET, COATED ORAL DAILY
Qty: 90 | Refills: 3 | Status: ACTIVE | COMMUNITY
Start: 2023-10-09 | End: 1900-01-01

## 2024-01-04 ENCOUNTER — LABORATORY RESULT (OUTPATIENT)
Age: 89
End: 2024-01-04

## 2024-01-24 ENCOUNTER — APPOINTMENT (OUTPATIENT)
Dept: CARDIOLOGY | Facility: CLINIC | Age: 89
End: 2024-01-24

## 2024-01-24 RX ORDER — EMPAGLIFLOZIN 10 MG/1
10 TABLET, FILM COATED ORAL
Qty: 90 | Refills: 3 | Status: ACTIVE | COMMUNITY
Start: 2023-05-03 | End: 1900-01-01

## 2024-03-20 ENCOUNTER — APPOINTMENT (OUTPATIENT)
Dept: CARDIOLOGY | Facility: CLINIC | Age: 89
End: 2024-03-20
Payer: MEDICARE

## 2024-03-20 VITALS — DIASTOLIC BLOOD PRESSURE: 60 MMHG | SYSTOLIC BLOOD PRESSURE: 102 MMHG

## 2024-03-20 VITALS
WEIGHT: 171 LBS | DIASTOLIC BLOOD PRESSURE: 52 MMHG | BODY MASS INDEX: 33.57 KG/M2 | HEART RATE: 60 BPM | SYSTOLIC BLOOD PRESSURE: 94 MMHG | HEIGHT: 60 IN | OXYGEN SATURATION: 96 % | TEMPERATURE: 97.9 F

## 2024-03-20 DIAGNOSIS — E78.2 MIXED HYPERLIPIDEMIA: ICD-10-CM

## 2024-03-20 PROCEDURE — 93000 ELECTROCARDIOGRAM COMPLETE: CPT

## 2024-03-20 PROCEDURE — 99215 OFFICE O/P EST HI 40 MIN: CPT

## 2024-03-20 RX ORDER — PANTOPRAZOLE 40 MG/1
40 TABLET, DELAYED RELEASE ORAL
Refills: 0 | Status: ACTIVE | COMMUNITY

## 2024-03-20 RX ORDER — SPIRONOLACTONE 25 MG/1
25 TABLET ORAL DAILY
Qty: 90 | Refills: 1 | Status: DISCONTINUED | COMMUNITY
End: 2024-03-20

## 2024-03-20 RX ORDER — TURMERIC ROOT EXTRACT 500 MG
500 TABLET ORAL AS DIRECTED
Refills: 0 | Status: ACTIVE | COMMUNITY

## 2024-06-07 ENCOUNTER — NON-APPOINTMENT (OUTPATIENT)
Age: 89
End: 2024-06-07

## 2024-06-14 ENCOUNTER — APPOINTMENT (OUTPATIENT)
Dept: HEART FAILURE | Facility: CLINIC | Age: 89
End: 2024-06-14
Payer: MEDICARE

## 2024-06-14 VITALS
WEIGHT: 171 LBS | HEART RATE: 61 BPM | DIASTOLIC BLOOD PRESSURE: 78 MMHG | HEIGHT: 60 IN | BODY MASS INDEX: 33.57 KG/M2 | OXYGEN SATURATION: 97 % | SYSTOLIC BLOOD PRESSURE: 138 MMHG

## 2024-06-14 DIAGNOSIS — I35.0 NONRHEUMATIC AORTIC (VALVE) STENOSIS: ICD-10-CM

## 2024-06-14 DIAGNOSIS — I50.22 CHRONIC SYSTOLIC (CONGESTIVE) HEART FAILURE: ICD-10-CM

## 2024-06-14 PROCEDURE — 99214 OFFICE O/P EST MOD 30 MIN: CPT

## 2024-06-14 NOTE — CARDIOLOGY SUMMARY
[de-identified] : SInus with RBBB [de-identified] : TTE March 2023: DANIELLE 0.69cm, DI 0.23, peak AV 29.2mmHg, moderate to severe calcification of the aortic valve, mean 15.7mmHg, LVEf 30-35%, Normal RV, mild to moderate MR, mild to moderate TR, PASP 44.9 [de-identified] : TAVR CT 2023 calcified aortic valve with an Agatston calcium score of 526 [de-identified] : March 2023: normal LM, minor irregularities in LAD, LCx, RCA and ramus, LV to AO pullback pressure gradient of 15

## 2024-06-14 NOTE — PHYSICAL EXAM
[Well Developed] : well developed [No Acute Distress] : no acute distress [Normal Conjunctiva] : normal conjunctiva [Normal Venous Pressure] : normal venous pressure [No Carotid Bruit] : no carotid bruit [Normal S1, S2] : normal S1, S2 [No Murmur] : no murmur [Clear Lung Fields] : clear lung fields [Good Air Entry] : good air entry [Soft] : abdomen soft [Non Tender] : non-tender [Normal Gait] : normal gait [No Edema] : no edema [Moves all extremities] : moves all extremities [de-identified] : warm

## 2024-06-14 NOTE — ASSESSMENT
[FreeTextEntry1] : Patient is a 89 year old woman with history of CAD, HTN, chronic systolic heart failure, and aortic stenosis who comes in to establish care with heart failure.  # Chronic systolic heart failure: Patient endorses NYHA class 3 symptoms -GDMT: Increase entresto to 97/103mg BID. C/w  metoprolol and  spironolactone 25mg daily. Will have her take jardiance every other day for now. INstructed to keep an eye on her weight - Device: WIll be getting a repeat echo in a couple of weeks. If EF <35% will discuss primary prevention ICD, however at this time she is not interested but will readdress it -HF lifestyle modifications including daily weights, BP log, and low sodium diet reiterated. Pt given daily weight and BP log and verbalized understanding to notify office if >3lb/1 day or >5 lb/1 week weight gain  #Aortic stenosis: The patient has had an extensive workup done and given all the testing likely has moderate to severe AS but her calcium score is only 526 which also points to the AS being not severe - will continue to optimize her heart failure medications as above - She will have followup appointments with the CTS team but at this time is not a TAVR candidate for the above mentioned reasons .

## 2024-06-14 NOTE — HISTORY OF PRESENT ILLNESS
[FreeTextEntry1] : Patient is a 89 year old woman with history of CAD, HTN, chronic systolic heart failure, and aortic stenosis who comes in to establish care with heart failure.   The has been having SOB with exertion for about a year now. She states her symptoms are variable but more recently in the last couple of months she has been having SOB with any exertional activity. She also reports feels full in the chest when she eats and that the sensation is improved after she takes off her bra.   She was treated with medical therapy for her HFrEF but she continued to have HAQUE so her aortic valve was evaluated further. She had a dobutamine stress TTE which was consistent with moderate AS in 2/2022. She then had a YOLANDA which showed severe AS but at that time did not want to proceed with TAVR. But she continued to remain symptomatic and agreed to undergo more workup.  TTE:3/23/23: DANIELLE 0.65, AV mG 16, AV velocity: 2.7, Dimensionless Index 0.23, mild AI, mild-mod MR/TR, EF 30%, NL RV, G1DD and  TAVR CT showed that her calcium score was 526. Given the lack of calcium and what appears to be moderate AS or LFLG AS on echo, TAVR is deferred for now.   6/7/23: Patient comes in for followup. She was started on jardiance last time she came to the office. She notes that her SOB and HAQUE is improved. She however still has SOB especially if she has to walk up stairs or if she bends and stands up she feels SOB  12/20/23: Patient comes in for followup. She is taking her medications and feels like she is peeing all the time. She however still feels SOB doing more exertional activities.  6/14/2024: Patient feels well overall. She states since using the jardiance that she is peeing all day and all night even though she is takingit in the AM. Otherwise she is feeling ok

## 2024-06-19 ENCOUNTER — APPOINTMENT (OUTPATIENT)
Dept: CARDIOLOGY | Facility: CLINIC | Age: 89
End: 2024-06-19
Payer: MEDICARE

## 2024-06-19 PROCEDURE — 93306 TTE W/DOPPLER COMPLETE: CPT

## 2024-06-19 RX ORDER — METOPROLOL TARTRATE 50 MG/1
50 TABLET, FILM COATED ORAL TWICE DAILY
Qty: 90 | Refills: 1 | Status: ACTIVE | COMMUNITY
Start: 1900-01-01 | End: 1900-01-01

## 2024-06-19 RX ORDER — PRAVASTATIN SODIUM 40 MG/1
40 TABLET ORAL
Qty: 90 | Refills: 3 | Status: ACTIVE | COMMUNITY
Start: 1900-01-01 | End: 1900-01-01

## 2024-06-20 RX ORDER — SACUBITRIL AND VALSARTAN 97; 103 MG/1; MG/1
97-103 TABLET, FILM COATED ORAL TWICE DAILY
Qty: 180 | Refills: 1 | Status: ACTIVE | COMMUNITY
Start: 2023-12-20 | End: 1900-01-01

## 2024-07-01 ENCOUNTER — OFFICE (OUTPATIENT)
Dept: URBAN - METROPOLITAN AREA CLINIC 97 | Facility: CLINIC | Age: 89
Setting detail: OPHTHALMOLOGY
End: 2024-07-01
Payer: MEDICARE

## 2024-07-01 DIAGNOSIS — E11.9: ICD-10-CM

## 2024-07-01 DIAGNOSIS — Z96.1: ICD-10-CM

## 2024-07-01 DIAGNOSIS — H26.493: ICD-10-CM

## 2024-07-01 DIAGNOSIS — H43.22: ICD-10-CM

## 2024-07-01 DIAGNOSIS — H43.393: ICD-10-CM

## 2024-07-01 PROCEDURE — 92014 COMPRE OPH EXAM EST PT 1/>: CPT | Performed by: OPTOMETRIST

## 2024-07-01 ASSESSMENT — CONFRONTATIONAL VISUAL FIELD TEST (CVF)
OS_FINDINGS: FULL
OD_FINDINGS: FULL

## 2024-07-03 ENCOUNTER — NON-APPOINTMENT (OUTPATIENT)
Age: 89
End: 2024-07-03

## 2024-08-29 LAB — HBA1C MFR BLD HPLC: 5.9

## 2024-10-09 ENCOUNTER — APPOINTMENT (OUTPATIENT)
Dept: CARDIOLOGY | Facility: CLINIC | Age: 89
End: 2024-10-09

## 2024-10-15 ENCOUNTER — APPOINTMENT (OUTPATIENT)
Dept: CARDIOLOGY | Facility: CLINIC | Age: 89
End: 2024-10-15
Payer: MEDICARE

## 2024-10-15 ENCOUNTER — NON-APPOINTMENT (OUTPATIENT)
Age: 89
End: 2024-10-15

## 2024-10-15 VITALS
OXYGEN SATURATION: 93 % | HEIGHT: 60 IN | DIASTOLIC BLOOD PRESSURE: 58 MMHG | BODY MASS INDEX: 32.98 KG/M2 | WEIGHT: 168 LBS | SYSTOLIC BLOOD PRESSURE: 104 MMHG | HEART RATE: 61 BPM

## 2024-10-15 VITALS — SYSTOLIC BLOOD PRESSURE: 104 MMHG | DIASTOLIC BLOOD PRESSURE: 60 MMHG

## 2024-10-15 DIAGNOSIS — Z79.01 LONG TERM (CURRENT) USE OF ANTICOAGULANTS: ICD-10-CM

## 2024-10-15 DIAGNOSIS — Z79.899 OTHER LONG TERM (CURRENT) DRUG THERAPY: ICD-10-CM

## 2024-10-15 DIAGNOSIS — I48.0 PAROXYSMAL ATRIAL FIBRILLATION: ICD-10-CM

## 2024-10-15 PROCEDURE — 93000 ELECTROCARDIOGRAM COMPLETE: CPT

## 2024-10-15 PROCEDURE — G2211 COMPLEX E/M VISIT ADD ON: CPT

## 2024-10-15 PROCEDURE — 99214 OFFICE O/P EST MOD 30 MIN: CPT

## 2024-10-23 ENCOUNTER — APPOINTMENT (OUTPATIENT)
Dept: ELECTROPHYSIOLOGY | Facility: CLINIC | Age: 89
End: 2024-10-23
Payer: MEDICARE

## 2024-10-23 ENCOUNTER — APPOINTMENT (OUTPATIENT)
Dept: ELECTROPHYSIOLOGY | Facility: CLINIC | Age: 89
End: 2024-10-23

## 2024-10-23 DIAGNOSIS — Z78.9 OTHER SPECIFIED HEALTH STATUS: ICD-10-CM

## 2024-10-23 DIAGNOSIS — I25.118 ATHEROSCLEROTIC HEART DISEASE OF NATIVE CORONARY ARTERY WITH OTHER FORMS OF ANGINA PECTORIS: ICD-10-CM

## 2024-10-23 DIAGNOSIS — I50.22 CHRONIC SYSTOLIC (CONGESTIVE) HEART FAILURE: ICD-10-CM

## 2024-10-23 DIAGNOSIS — I35.0 NONRHEUMATIC AORTIC (VALVE) STENOSIS: ICD-10-CM

## 2024-10-23 DIAGNOSIS — I50.1 LEFT VENTRICULAR FAILURE, UNSPECIFIED: ICD-10-CM

## 2024-10-23 DIAGNOSIS — E11.9 TYPE 2 DIABETES MELLITUS W/OUT COMPLICATIONS: ICD-10-CM

## 2024-10-23 DIAGNOSIS — I21.9 ACUTE MYOCARDIAL INFARCTION, UNSPECIFIED: ICD-10-CM

## 2024-10-23 DIAGNOSIS — I10 ESSENTIAL (PRIMARY) HYPERTENSION: ICD-10-CM

## 2024-10-23 PROCEDURE — 99214 OFFICE O/P EST MOD 30 MIN: CPT

## 2024-10-23 PROCEDURE — G2211 COMPLEX E/M VISIT ADD ON: CPT

## 2024-10-29 ENCOUNTER — RX RENEWAL (OUTPATIENT)
Age: 89
End: 2024-10-29

## 2025-01-27 ENCOUNTER — RX RENEWAL (OUTPATIENT)
Age: 89
End: 2025-01-27

## 2025-02-04 ENCOUNTER — APPOINTMENT (OUTPATIENT)
Dept: CARDIOLOGY | Facility: CLINIC | Age: 89
End: 2025-02-04
Payer: MEDICARE

## 2025-02-04 VITALS
HEART RATE: 71 BPM | DIASTOLIC BLOOD PRESSURE: 72 MMHG | BODY MASS INDEX: 33.38 KG/M2 | SYSTOLIC BLOOD PRESSURE: 122 MMHG | HEIGHT: 60 IN | WEIGHT: 170 LBS | OXYGEN SATURATION: 96 %

## 2025-02-04 DIAGNOSIS — I50.22 CHRONIC SYSTOLIC (CONGESTIVE) HEART FAILURE: ICD-10-CM

## 2025-02-04 DIAGNOSIS — I48.0 PAROXYSMAL ATRIAL FIBRILLATION: ICD-10-CM

## 2025-02-04 DIAGNOSIS — I35.0 NONRHEUMATIC AORTIC (VALVE) STENOSIS: ICD-10-CM

## 2025-02-04 DIAGNOSIS — I10 ESSENTIAL (PRIMARY) HYPERTENSION: ICD-10-CM

## 2025-02-04 DIAGNOSIS — I25.118 ATHEROSCLEROTIC HEART DISEASE OF NATIVE CORONARY ARTERY WITH OTHER FORMS OF ANGINA PECTORIS: ICD-10-CM

## 2025-02-04 DIAGNOSIS — E78.2 MIXED HYPERLIPIDEMIA: ICD-10-CM

## 2025-02-04 DIAGNOSIS — Z79.01 LONG TERM (CURRENT) USE OF ANTICOAGULANTS: ICD-10-CM

## 2025-02-04 PROCEDURE — 99214 OFFICE O/P EST MOD 30 MIN: CPT

## 2025-02-04 PROCEDURE — G2211 COMPLEX E/M VISIT ADD ON: CPT

## 2025-02-14 ENCOUNTER — RX RENEWAL (OUTPATIENT)
Age: 89
End: 2025-02-14

## 2025-06-05 ENCOUNTER — NON-APPOINTMENT (OUTPATIENT)
Age: 89
End: 2025-06-05

## 2025-06-05 ENCOUNTER — APPOINTMENT (OUTPATIENT)
Dept: CARDIOLOGY | Facility: CLINIC | Age: 89
End: 2025-06-05
Payer: MEDICARE

## 2025-06-05 VITALS
SYSTOLIC BLOOD PRESSURE: 128 MMHG | HEIGHT: 60 IN | HEART RATE: 66 BPM | WEIGHT: 170 LBS | BODY MASS INDEX: 33.38 KG/M2 | DIASTOLIC BLOOD PRESSURE: 70 MMHG | OXYGEN SATURATION: 96 %

## 2025-06-05 DIAGNOSIS — I50.22 CHRONIC SYSTOLIC (CONGESTIVE) HEART FAILURE: ICD-10-CM

## 2025-06-05 DIAGNOSIS — I35.0 NONRHEUMATIC AORTIC (VALVE) STENOSIS: ICD-10-CM

## 2025-06-05 DIAGNOSIS — I25.118 ATHEROSCLEROTIC HEART DISEASE OF NATIVE CORONARY ARTERY WITH OTHER FORMS OF ANGINA PECTORIS: ICD-10-CM

## 2025-06-05 PROCEDURE — 93000 ELECTROCARDIOGRAM COMPLETE: CPT

## 2025-06-05 PROCEDURE — G2211 COMPLEX E/M VISIT ADD ON: CPT

## 2025-06-05 PROCEDURE — 99214 OFFICE O/P EST MOD 30 MIN: CPT

## 2025-06-05 PROCEDURE — 93306 TTE W/DOPPLER COMPLETE: CPT

## 2025-08-13 ENCOUNTER — OFFICE (OUTPATIENT)
Dept: URBAN - METROPOLITAN AREA CLINIC 97 | Facility: CLINIC | Age: OVER 89
Setting detail: OPHTHALMOLOGY
End: 2025-08-13
Payer: MEDICARE

## 2025-08-13 VITALS — HEIGHT: 55 IN

## 2025-08-13 DIAGNOSIS — H26.493: ICD-10-CM

## 2025-08-13 DIAGNOSIS — H35.361: ICD-10-CM

## 2025-08-13 DIAGNOSIS — H43.813: ICD-10-CM

## 2025-08-13 DIAGNOSIS — H43.22: ICD-10-CM

## 2025-08-13 PROCEDURE — 92134 CPTRZ OPH DX IMG PST SGM RTA: CPT | Performed by: OPTOMETRIST

## 2025-08-13 PROCEDURE — 92014 COMPRE OPH EXAM EST PT 1/>: CPT | Performed by: OPTOMETRIST

## 2025-08-13 ASSESSMENT — REFRACTION_CURRENTRX
OS_OVR_VA: 20/
OD_SPHERE: +2.50
OD_OVR_VA: 20/
OS_VPRISM_DIRECTION: SV
OS_SPHERE: +2.50
OD_VPRISM_DIRECTION: SV

## 2025-08-13 ASSESSMENT — VISUAL ACUITY
OD_BCVA: 20/25-2
OS_BCVA: 20/20

## 2025-08-13 ASSESSMENT — KERATOMETRY
OS_K2POWER_DIOPTERS: 45.75
OS_AXISANGLE_DEGREES: 109
METHOD_AUTO_MANUAL: AUTO
OD_AXISANGLE_DEGREES: 180
OS_K1POWER_DIOPTERS: 44.75
OD_K2POWER_DIOPTERS: 44.75
OD_K1POWER_DIOPTERS: 43.75

## 2025-08-13 ASSESSMENT — REFRACTION_AUTOREFRACTION
OS_SPHERE: -0.75
OD_AXIS: 173
OD_SPHERE: -0.75
OD_CYLINDER: +1.00
OS_CYLINDER: +0.50
OS_AXIS: 115

## 2025-08-13 ASSESSMENT — REFRACTION_MANIFEST
OD_VA1: 20/20
OU_VA: 20/20
OD_SPHERE: -0.25
OD_ADD: +2.50
OS_ADD: +2.50
OD_CYLINDER: +0.25
OS_SPHERE: PLANO
OS_VA1: 20/20-2
OD_AXIS: 010
OD_VA2: 20/20(J1+)
OS_VA2: 20/20(J1+)
OS_AXIS: 165
OS_CYLINDER: +0.50

## 2025-08-13 ASSESSMENT — CONFRONTATIONAL VISUAL FIELD TEST (CVF)
OS_FINDINGS: FULL
OD_FINDINGS: FULL